# Patient Record
Sex: FEMALE | Race: WHITE | NOT HISPANIC OR LATINO | Employment: OTHER
[De-identification: names, ages, dates, MRNs, and addresses within clinical notes are randomized per-mention and may not be internally consistent; named-entity substitution may affect disease eponyms.]

---

## 2017-01-11 ENCOUNTER — SURGERY (OUTPATIENT)
Age: 62
End: 2017-01-11

## 2017-01-11 RX ADMIN — BUPIVACAINE HYDROCHLORIDE 10 ML: 2.5 INJECTION, SOLUTION EPIDURAL; INFILTRATION; INTRACAUDAL; PERINEURAL at 09:01

## 2017-01-11 RX ADMIN — MIDAZOLAM HYDROCHLORIDE 1 MG: 1 INJECTION, SOLUTION INTRAMUSCULAR; INTRAVENOUS at 09:01

## 2017-01-11 RX ADMIN — FENTANYL CITRATE 25 MCG: 50 INJECTION, SOLUTION INTRAMUSCULAR; INTRAVENOUS at 09:01

## 2017-01-11 RX ADMIN — LIDOCAINE HYDROCHLORIDE 20 ML: 10 INJECTION, SOLUTION INFILTRATION; PERINEURAL at 09:01

## 2017-01-18 ENCOUNTER — OFFICE VISIT (OUTPATIENT)
Dept: INTERNAL MEDICINE | Facility: CLINIC | Age: 62
End: 2017-01-18
Payer: COMMERCIAL

## 2017-01-18 VITALS
DIASTOLIC BLOOD PRESSURE: 78 MMHG | OXYGEN SATURATION: 98 % | BODY MASS INDEX: 21.62 KG/M2 | HEIGHT: 62 IN | TEMPERATURE: 99 F | WEIGHT: 117.5 LBS | HEART RATE: 87 BPM | SYSTOLIC BLOOD PRESSURE: 144 MMHG

## 2017-01-18 DIAGNOSIS — E78.5 HYPERLIPIDEMIA, UNSPECIFIED HYPERLIPIDEMIA TYPE: ICD-10-CM

## 2017-01-18 DIAGNOSIS — I10 HYPERTENSION, ESSENTIAL: Primary | ICD-10-CM

## 2017-01-18 DIAGNOSIS — Z12.11 COLON CANCER SCREENING: ICD-10-CM

## 2017-01-18 PROCEDURE — 99213 OFFICE O/P EST LOW 20 MIN: CPT | Mod: S$GLB,,, | Performed by: FAMILY MEDICINE

## 2017-01-18 PROCEDURE — 3074F SYST BP LT 130 MM HG: CPT | Mod: S$GLB,,, | Performed by: FAMILY MEDICINE

## 2017-01-18 PROCEDURE — 3078F DIAST BP <80 MM HG: CPT | Mod: S$GLB,,, | Performed by: FAMILY MEDICINE

## 2017-01-18 PROCEDURE — 99999 PR PBB SHADOW E&M-EST. PATIENT-LVL III: CPT | Mod: PBBFAC,,, | Performed by: FAMILY MEDICINE

## 2017-01-18 PROCEDURE — 1159F MED LIST DOCD IN RCRD: CPT | Mod: S$GLB,,, | Performed by: FAMILY MEDICINE

## 2017-01-18 RX ORDER — LISINOPRIL 20 MG/1
20 TABLET ORAL DAILY
Qty: 30 TABLET | Refills: 5 | Status: SHIPPED | OUTPATIENT
Start: 2017-01-18 | End: 2017-07-23 | Stop reason: SDUPTHER

## 2017-01-18 NOTE — PROGRESS NOTES
Subjective:       Patient ID: Didi Angeles is a 61 y.o. female.    Chief Complaint: Follow-up    HPI  Review of Systems   Constitutional: Negative for chills, fatigue and fever.   HENT: Negative for congestion and trouble swallowing.    Eyes: Negative for redness.   Respiratory: Negative for cough, chest tightness and shortness of breath.    Cardiovascular: Negative for chest pain, palpitations and leg swelling.   Gastrointestinal: Negative for abdominal pain and blood in stool.   Genitourinary: Negative for hematuria and menstrual problem.   Musculoskeletal: Positive for arthralgias and back pain. Negative for gait problem, joint swelling, myalgias and neck pain.   Skin: Negative for color change and rash.   Neurological: Negative for tremors, speech difficulty, weakness, numbness and headaches.   Hematological: Negative for adenopathy. Does not bruise/bleed easily.   Psychiatric/Behavioral: Negative for behavioral problems, confusion and sleep disturbance. The patient is not nervous/anxious.        Objective:      Physical Exam   Constitutional: She is oriented to person, place, and time. She appears well-developed and well-nourished. No distress.   Neck: Neck supple.   Pulmonary/Chest: Effort normal.   Musculoskeletal: She exhibits no edema.        Right lower leg: She exhibits no edema.        Left lower leg: She exhibits no edema.   Neurological: She is alert and oriented to person, place, and time.   Skin: Skin is warm and dry. No rash noted.   Psychiatric: She has a normal mood and affect. Her behavior is normal. Judgment and thought content normal.   Nursing note and vitals reviewed.      Assessment:       1. Hypertension, essential    2. Hyperlipidemia, unspecified hyperlipidemia type    3. Colon cancer screening        Plan:   Didi was seen today for follow-up.    Diagnoses and all orders for this visit:    Hypertension, essential    Hyperlipidemia, unspecified hyperlipidemia type    Colon cancer  screening  -     Case request GI: COLONOSCOPY    Other orders  -     lisinopril (PRINIVIL,ZESTRIL) 20 MG tablet; Take 1 tablet (20 mg total) by mouth once daily.      See meds, orders, follow up, routing and instructions sections of encounter.  The patient is in for a blood pressure followup.  We discussed her labs on her   last visit.  She was started on lisinopril 10 and pravastatin.  Her blood   pressure manually today was above 140.  We will increase the lisinopril dose to   20.  She is having no appreciable side effects.  Follow up in two months with   laboratory.    I was able to convince her for a colonoscopy today.  She is having some   radioablation therapy for her back in the meantime.      BUNNY/HN  dd: 01/18/2017 12:21:36 (CST)  td: 01/19/2017 01:19:27 (CST)  Doc ID   #[id number]  Job ID #[id number]    CC:

## 2017-01-18 NOTE — MR AVS SNAPSHOT
[facility]  [address]  [facility] [address]  Phone: [phone]  Fax: [phone]                  Didi Angeles   2017 1:00 PM   Office Visit    Description:  Female : 1955   Provider:  Carrillo Flynn MD   Department:  [facility]           Reason for Visit     Follow-up           Diagnoses this Visit        Comments    Hypertension, essential    -  Primary     Hyperlipidemia, unspecified hyperlipidemia type         Colon cancer screening                To Do List           Future Appointments        Provider Department Dept Phone    2017 1:00 PM Carrillo Flynn MD [facility] [phone]    2017 9:40 AM Ana María Ibrahim MD [facility] [phone]    2017 10:45 AM Charley Bae MD [facility] Pain Management [phone]    3/13/2017 8:00 AM LAB, SAME DAY [facility] [phone]      Your Future Surgeries/Procedures     2017   Surgery with Charley Bae MD   [facility]    [address]  [facility] [address] [phone]              To Schedule:     Please call the Endoscopy Department at [phone] to schedule your appointment.          Goals (5 Years of Data)     None      Follow-Up and Disposition     Return in about 2 months (around 3/18/2017) for Blood Pressure recheck, lab review (after future labs).    Follow-up and Disposition History       These Medications        Disp Refills Start End    lisinopril (PRINIVIL,ZESTRIL) 20 MG tablet 30 tablet 5 2017     Take 1 tablet (20 mg total) by mouth once daily. - Oral    Pharmacy: [facility]/pharmacy #[id number] - [place], [place] - [address]  #: [phone]         [facility] On Call     [facility] On Call Nurse Care Line -  Assistance  Registered nurses in the [facility] provide clinical advisement, health  "education, appointment booking, and other advisory services.  Call for this free service at [phone].             Medications           Message regarding Medications     Verify the changes and/or additions to your medication regime listed below are the same as discussed with your clinician today.  If any of these changes or additions are incorrect, please notify your healthcare provider.        CHANGE how you are taking these medications     Start Taking Instead of    lisinopril (PRINIVIL,ZESTRIL) 20 MG tablet lisinopril 10 MG tablet    Dosage:  Take 1 tablet (20 mg total) by mouth once daily. Dosage:  Take 1 tablet (10 mg total) by mouth once daily.    Reason for Change:  Reorder            Verify that the below list of medications is an accurate representation of the medications you are currently taking.  If none reported, the list may be blank. If incorrect, please contact your healthcare provider. Carry this list with you in case of emergency.           Current Medications     CALCIUM CARBONATE/VITAMIN D3 (CALCIUM 600 + D,3, ORAL) Take by mouth once daily.    duloxetine (CYMBALTA) 60 MG capsule Take 1 capsule (60 mg total) by mouth once daily.    gabapentin (NEURONTIN) 300 MG capsule Take 1 capsule (300 mg total) by mouth as directed. Take one cap i the morning, one at noon and two at bedtime.    KRILL OIL ORAL Take by mouth once daily.    lisinopril (PRINIVIL,ZESTRIL) 20 MG tablet Take 1 tablet (20 mg total) by mouth once daily.    pravastatin (PRAVACHOL) 40 MG tablet Take 1 tablet (40 mg total) by mouth once daily.           Clinical Reference Information           Vital Signs - Last Recorded  Most recent update: 1/18/2017 11:56 AM by Carrillo Flynn MD    BP Pulse Temp Ht Wt SpO2    (!) 144/78 87 98.6 °F (37 °C) 5' 2" (1.575 m) 53.3 kg (117 lb 8.1 oz) 98%    BMI                21.49 kg/m2          Blood Pressure          Most Recent Value    BP  (!)  144/78      Allergies as of 1/18/2017     " Compazine [Prochlorperazine Edisylate]    Sulfa (Sulfonamide Antibiotics)    Pcn [Penicillins]      Immunizations Administered on Date of Encounter - 1/18/2017     None      Orders Placed During Today's Visit      Normal Orders This Visit    Case request GI: COLONOSCOPY

## 2017-02-16 ENCOUNTER — OFFICE VISIT (OUTPATIENT)
Dept: PAIN MEDICINE | Facility: CLINIC | Age: 62
End: 2017-02-16
Payer: COMMERCIAL

## 2017-02-16 VITALS
TEMPERATURE: 98 F | BODY MASS INDEX: 21.67 KG/M2 | RESPIRATION RATE: 18 BRPM | SYSTOLIC BLOOD PRESSURE: 130 MMHG | HEIGHT: 62 IN | HEART RATE: 72 BPM | DIASTOLIC BLOOD PRESSURE: 76 MMHG | WEIGHT: 117.75 LBS

## 2017-02-16 DIAGNOSIS — F32.2 MAJOR DEPRESSIVE DISORDER, SINGLE EPISODE, SEVERE WITHOUT PSYCHOTIC FEATURES: ICD-10-CM

## 2017-02-16 PROCEDURE — 3078F DIAST BP <80 MM HG: CPT | Mod: S$GLB,,, | Performed by: PSYCHIATRY & NEUROLOGY

## 2017-02-16 PROCEDURE — 99999 PR PBB SHADOW E&M-EST. PATIENT-LVL III: CPT | Mod: PBBFAC,,, | Performed by: PSYCHIATRY & NEUROLOGY

## 2017-02-16 PROCEDURE — 99214 OFFICE O/P EST MOD 30 MIN: CPT | Mod: S$GLB,,, | Performed by: PSYCHIATRY & NEUROLOGY

## 2017-02-16 PROCEDURE — 3075F SYST BP GE 130 - 139MM HG: CPT | Mod: S$GLB,,, | Performed by: PSYCHIATRY & NEUROLOGY

## 2017-02-16 RX ORDER — DULOXETIN HYDROCHLORIDE 60 MG/1
60 CAPSULE, DELAYED RELEASE ORAL DAILY
Qty: 60 CAPSULE | Refills: 2 | Status: SHIPPED | OUTPATIENT
Start: 2017-02-16 | End: 2017-04-25 | Stop reason: SDUPTHER

## 2017-02-16 RX ORDER — GABAPENTIN 300 MG/1
300 CAPSULE ORAL 3 TIMES DAILY
Qty: 120 CAPSULE | Refills: 2 | Status: SHIPPED | OUTPATIENT
Start: 2017-02-16 | End: 2017-04-25 | Stop reason: SDUPTHER

## 2017-02-16 RX ORDER — OXYCODONE AND ACETAMINOPHEN 5; 325 MG/1; MG/1
1 TABLET ORAL 2 TIMES DAILY PRN
Qty: 20 TABLET | Refills: 0 | Status: SHIPPED | OUTPATIENT
Start: 2017-02-16 | End: 2017-04-25 | Stop reason: SDUPTHER

## 2017-02-16 NOTE — PROGRESS NOTES
"Outpatient Psychiatry Follow-Up Visit (MD/NP)    2/16/2017    Clinical Status of Patient:  Outpatient (Ambulatory)    Chief Complaint:  Didi Angeles is a 61 y.o. female who presents today for follow-up of depression.  Met with patient.      Interval History and Content of Current Session:  Interim Events/Subjective Report/Content of Current Session: Pt reports that she has been taking the cymbalta and the gabapentin as prescribed and that she is doing "O"", better than she was beofre but still having issues with the chronic pain. That she is trying to exercise but the pain makes it hard for her to stay compliant. That she had an RFA done and that did help. That she is going on a trip to meet her mother tomorrow and she is very worried about being able to withstand the pain, talked about this and I will give her a prescription for percocet 5 mg , kurt as needed only. Have also encourgaed pt to get back to yoga classes. She rports that her mood has been "OK".      Psychotherapy:  · Target symptoms: depression, chronic pain  · Why chosen therapy is appropriate versus another modality: relevant to diagnosis, patient responds to this modality  · Outcome monitoring methods: self-report, observation  · Therapeutic intervention type: behavior modifying psychotherapy, supportive psychotherapy  · Topics discussed/themes: stress related to medical comorbidities, difficulty managing affect in interpersonal relationships, building skills sets for symptom management, symptom recognition  · The patient's response to the intervention is accepting. The patient's progress toward treatment goals is fair.   · Duration of intervention: 30 minutes.    Review of Systems   · PSYCHIATRIC: Pertinant items are noted in the narrative.    Past Medical, Family and Social History: The patient's past medical, family and social history have been reviewed and updated as appropriate within the electronic medical record - see encounter " "notes.    Compliance: yes    Side effects: None    Risk Parameters:  Patient reports no suicidal ideation  Patient reports no homicidal ideation  Patient reports no self-injurious behavior  Patient reports no violent behavior    Exam (detailed: at least 9 elements; comprehensive: all 15 elements)   Constitutional  Vitals:  Most recent vital signs, dated less than 90 days prior to this appointment, were reviewed.   Vitals:    02/16/17 0946   BP: 130/76   Pulse: 72   Resp: 18   Temp: 97.7 °F (36.5 °C)   TempSrc: Oral   Weight: 53.4 kg (117 lb 11.6 oz)   Height: 5' 2" (1.575 m)        General:  age appropriate, casually dressed, neatly groomed     Musculoskeletal  Muscle Strength/Tone:  no tremor, no tic   Gait & Station:  non-ataxic     Psychiatric  Speech:  no latency; no press   Mood & Affect:  " in pain"  congruent and appropriate   Thought Process:  normal and logical, goal-directed   Associations:  intact   Thought Content:  normal, no suicidality, no homicidality, delusions, or paranoia   Insight:  intact   Judgement: behavior is adequate to circumstances   Orientation:  grossly intact   Memory: intact for content of interview   Language: grossly intact   Attention Span & Concentration:  able to focus   Fund of Knowledge:  intact and appropriate to age and level of education     Assessment and Diagnosis   Status/Progress: Based on the examination today, the patient's problem(s) is/are inadequately controlled.  New problems have been presented today.   Co-morbidities are complicating management of the primary condition.  There are no active rule-out diagnoses for this patient at this time.       Impression: Pt is a 62 Y/O CW with PMHx sig for Chronic low back pain , OA with        Opioid dependence in efr.  Major depressive disorder, mod, recurrent          Strengths and Liabilities: Strength: Patient accepts guidance/feedback, Strength: Patient is expressive/articulate., Strength: Patient is intelligent., " Strength: Patient is motivated for change., Strength: Patient has positive support network., Strength: Patient has reasonable judgment.      Treatment Goals: Specify outcomes written in observable, behavioral terms:    Depression: acquiring relapse prevention skills, eliminating all depressive symptoms (BDI score <10 for 1 month), increasing energy, increasing interest in usual activities, increasing motivation, increasing self-reward for positive behaviors (one/day), increasing self-reward for positive thoughts (one/day), increasing social contacts (three/week), reducing excessive guilt, reducing fatigue and reducing negative automatic thoughts      Treatment Plan/Recommendations:    · Medication Management: Continue current medications. The risks and benefits of medication were discussed with the patient.  · Referral for further treatment to social work team for psychotherapy  · The treatment plan and follow up plan were reviewed with the patient.  Will cont the cymbalta 60 mg daily.  Will add percocet 5 mg bid prn severe pain   Will cont neurontin 300 mg in AM and noon and 600 mg at bedtime.   Discussed coping skills.  Discussed cont to use CBT.  Provided supportive psychotherapy.  Have discussed things pt can do to help with socialization.          Return to Clinic: 2 months

## 2017-02-16 NOTE — MR AVS SNAPSHOT
[facility]  [address]  [place] [address]  Phone: [phone]  Fax: [phone]                  Didi Angeles   2017 9:40 AM   Office Visit    Description:  Female : 1955   Provider:  Ana María Ibrahim MD   Department:  [facility]           Reason for Visit     Follow-up           Diagnoses this Visit        Comments    Major depressive disorder, single episode, severe without psychotic features                To Do List           Future Appointments        Provider Department Dept Phone    2017 10:45 AM Charley Bae MD [facility] [phone]    3/13/2017 8:00 AM LAB, SAME DAY [facility] [phone]    3/20/2017 10:40 AM Carrillo Flynn MD [facility] [phone]    2017 9:00 AM Ana María Ibrahim MD [facility] [phone]      Goals (5 Years of Data)     None       These Medications        Disp Refills Start End    duloxetine (CYMBALTA) 60 MG capsule 60 capsule 2 2017     Take 1 capsule (60 mg total) by mouth once daily. - Oral    [id number] [address] Ph #: [phone]       gabapentin (NEURONTIN) 300 MG capsule 120 capsule 2 2017    Take 1 capsule (300 mg total) by mouth 3 (three) times daily. Take one cap i the morning, one at noon and two at bedtime. - Oral    Pharmacy: [id number] [address] Ph #: [phone]       oxycodone-acetaminophen (PERCOCET) 5-325 mg per tablet 20 tablet 0 2017     Take 1 tablet by mouth 2 (two) times daily as needed for Pain. - Oral    [id number] [address] Ph #: [phone]         [facility] On Call     [facility] On Call Nurse Care Line - 24/7 Assistance  Registered nurses in the [facility] provide clinical advisement, health  education, appointment booking, and other advisory services.  Call for this free service at [phone].             Medications           Message regarding Medications     Verify the changes and/or additions to your medication regime listed below are the same as discussed with your clinician today.  If any of these changes or additions are incorrect, please notify your healthcare provider.        START taking these NEW medications        Refills    oxycodone-acetaminophen (PERCOCET) 5-325 mg per tablet 0    Sig: Take 1 tablet by mouth 2 (two) times daily as needed for Pain.    Class: Print    Route: Oral      CHANGE how you are taking these medications     Start Taking Instead of    gabapentin (NEURONTIN) 300 MG capsule gabapentin (NEURONTIN) 300 MG capsule    Dosage:  Take 1 capsule (300 mg total) by mouth 3 (three) times daily. Take one cap i the morning, one at noon and two at bedtime. Dosage:  Take 1 capsule (300 mg total) by mouth as directed. Take one cap i the morning, one at noon and two at bedtime.    Reason for Change:  Reorder            Verify that the below list of medications is an accurate representation of the medications you are currently taking.  If none reported, the list may be blank. If incorrect, please contact your healthcare provider. Carry this list with you in case of emergency.           Current Medications     CALCIUM CARBONATE/VITAMIN D3 (CALCIUM 600 + D,3, ORAL) Take by mouth once daily.    duloxetine (CYMBALTA) 60 MG capsule Take 1 capsule (60 mg total) by mouth once daily.    gabapentin (NEURONTIN) 300 MG capsule Take 1 capsule (300 mg total) by mouth 3 (three) times daily. Take one cap i the morning, one at noon and two at bedtime.    KRILL OIL ORAL Take by mouth once daily.    lisinopril (PRINIVIL,ZESTRIL) 20 MG tablet Take 1 tablet (20 mg total) by mouth once daily.    pravastatin (PRAVACHOL) 40 MG tablet Take 1 tablet (40 mg total) by mouth once daily.     "oxycodone-acetaminophen (PERCOCET) 5-325 mg per tablet Take 1 tablet by mouth 2 (two) times daily as needed for Pain.           Clinical Reference Information           Your Vitals Were     BP Pulse Temp Resp Height Weight    130/76 72 97.7 °F (36.5 °C) (Oral) 18 5' 2" (1.575 m) 53.4 kg (117 lb 11.6 oz)    BMI                21.53 kg/m2          Blood Pressure          Most Recent Value    BP  130/76      Allergies as of 2/16/2017     Compazine [Prochlorperazine Edisylate]    Sulfa (Sulfonamide Antibiotics)    Pcn [Penicillins]      Immunizations Administered on Date of Encounter - 2/16/2017     None      Language Assistance Services     ATTENTION: Language assistance services are available, free of charge. Please call [phone].      ATENCIÓN: Si mk mccarthy, tiene a barnhart disposición servicios gratuitos de asistencia lingüística. Llame al [phone].     CHÚ Ý: N?u b?n nói Ti?ng Vi?t, có các d?ch v? h? tr? ngôn ng? mi?n phí dành cho b?n. G?i s? [phone].         [facility] complies with applicable Federal civil rights laws and does not discriminate on the basis of race, color, national origin, age, disability, or sex.        "

## 2017-02-23 ENCOUNTER — OFFICE VISIT (OUTPATIENT)
Dept: PAIN MEDICINE | Facility: CLINIC | Age: 62
End: 2017-02-23
Attending: ANESTHESIOLOGY
Payer: COMMERCIAL

## 2017-02-23 VITALS
HEIGHT: 62 IN | WEIGHT: 119 LBS | HEART RATE: 87 BPM | SYSTOLIC BLOOD PRESSURE: 148 MMHG | TEMPERATURE: 98 F | BODY MASS INDEX: 21.9 KG/M2 | DIASTOLIC BLOOD PRESSURE: 92 MMHG

## 2017-02-23 DIAGNOSIS — M47.816 FACET ARTHROPATHY, LUMBAR: ICD-10-CM

## 2017-02-23 DIAGNOSIS — M53.3 SACROILIAC JOINT PAIN: Primary | ICD-10-CM

## 2017-02-23 PROCEDURE — 99999 PR PBB SHADOW E&M-EST. PATIENT-LVL III: CPT | Mod: PBBFAC,,, | Performed by: ANESTHESIOLOGY

## 2017-02-23 PROCEDURE — 3080F DIAST BP >= 90 MM HG: CPT | Mod: S$GLB,,, | Performed by: ANESTHESIOLOGY

## 2017-02-23 PROCEDURE — 3077F SYST BP >= 140 MM HG: CPT | Mod: S$GLB,,, | Performed by: ANESTHESIOLOGY

## 2017-02-23 PROCEDURE — 1160F RVW MEDS BY RX/DR IN RCRD: CPT | Mod: S$GLB,,, | Performed by: ANESTHESIOLOGY

## 2017-02-23 PROCEDURE — 99213 OFFICE O/P EST LOW 20 MIN: CPT | Mod: S$GLB,,, | Performed by: ANESTHESIOLOGY

## 2017-02-23 NOTE — PROGRESS NOTES
"  Chronic Pain - Follow Up    Referring Physician: No ref. provider found    Chief Complaint:   Chief Complaint   Patient presents with    Low-back Pain     Interval History 2/23/2017 :  The patient returns today following her left and right L3-5 RFA.  She has received lasting relief taking her pain down to a 2/10 today.  She has continued to see Dr. Ibrahim with continued benefit.  Additionally, she has continued the gabapentin 300 mg TID and the Duloxetine 60 mg daily.  She had a "crisis" a few weeks ago when she required a short course of percocet 5/325 mg BID PRN #20 dispensed without refills by Dr. Ibrahim due to an urgent need to drive long distance to [place] for "stressfull personal reasons".  Other than that she has had a chronic non-progressive bilateral symmetric muscle tightness at the mid-back that wraps around to the mid-axillary line without any other problems of LE weakness, pain, anterior belly pain, or GI distress.      Interval History 12/21/2016:  The patient returns today for follow up of lower back pain.  She is s/p bilateral L3,4,5 MBB on 12/7/16 with 90% relief for one day.  She is set up for RFAs next month because she had to wait until she has a .  She reports increased anxiety and stress recently regarding the health of her elderly mother and helping to care for her.  She continues to see Dr. Ibrahim.  She resumed Gabapentin.  She continues to take Cymbalta also.  Her pain today is 6/10.    Interval History 11/16/2016:  The patient returns today for follow up of all over pain.  Her worse pain today is located to her lower back.  She has intermittent radiation to her knees but this is rare.  She does have some numbness to her feet.  She has been weaning down Gabapentin because she wants to try to get off of it.  We have previously discussed lumbar MBB which she would like to further discuss at this time.  She reports depression lately related to chronic pain.  She feels " that her daily activities are inhibited because of her pain.  She has an appointment with Dr. Ibrahim today.  Her pain today is 3/10.    Interval History 9/15/2016:  The patient returns today for follow up of lower back pain.  Her pain mostly stays in her lower back without radiation. She does report numbness to BLE.  Reports that she has been doing well since her last visit.  She has weaned off of MS Contin and is not currently taking any opioid medications.  She is taking Cymbalta and Gabapentin with relief.  We have previously discussed medial branch blocks which she is still considering.  Her pain today is 4/10.  The patient denies any bowel or bladder incontinence or signs of saddle paresthesia.     Interval History 8/5/2016  The patient returns to the clinic today for a follow up visit, reports feeling much better, states she has low back pain at present of 2/10. Voices no other health concerns.    Interval History 6/28/2016:  The patient returns today for follow up of lower back and buttock pain. She denies any radiation into her legs. She denies any numbness or tingling. She is s/p bilateral SI joint injection on 6/7/16 with moderate relief of her buttock pain. We have not yet received records from previous pain physician in [place] She reports having cervical RFAs in the past but does not think she has had lumbar RFAs. She does report having lumbar facet joint injections in the past with relief. She has seen Dr. Ibrahim since her last visit, who recommends continued weaning of patient's opioids. She also has an appointment with Dr. Cheung, rheumatology, next month. Since her last visit, she has stopped the Percocet. She is now taking MS Contin 30 mg Q12h. She is also taking Gabapentin 100 mg TID and Cymbalta 60 mg QD. Her pain today is a 2/10.     Interval History 05/24/2016:  Patient presents in clinic with low back pain which she states is a 3/10 today. She currently takes MS Contin 30mg q12h,  Percocet 10/325 mg PRN, and Cymbalta.Since last visit patient has been able to wean down her percocet use. She was on average previously she was taking it TID and now is taking 1 pill every other day on average. She has increased her physical activity and is doing PT. No other health changes noted. We have not received records yet. She has not been scheduled to see Dr. Ibrahim or Rheumatology yet.    Initial encounter:    Didi Angeles presents to the clinic for the evaluation of Chronic Neck and low back pain. The pain started years ago following no injury and symptoms have been unchanged.    Brief history: Patient has a pain physician in [facility] and has been receiving a combination of Cymbalta 60 mg per day, morphine sulfate extended release 30 mg twice a day and oxycodone acetaminophen 10/325 every 8 hour by mouth when necessary and her  is consistent dating back to 4/16/2015, she is establishing care at [facility] with Dr. Flynn as her PCP.    DXA scan     Pain Description:    The current worst pain is in the lumbar spine bilaterally over the area of the sacroiliac joints and facet joints.      At BEST  1/10     At WORST  7/10 on the WORST day.      On average pain is rated as 4/10.     Today the pain is rated as 3/10    The pain is described as aching, dull, shooting, stabbing, tight band, tingling and grabbing and sore.      Symptoms interfere with daily activity and sleeping.     Exacerbating factors: Sitting, Standing and Lifting.      Mitigating factors laying down, medications and walking and stretching.     Patient denies night fever/night sweats, urinary incontinence, bowel incontinence, significant weight loss, significant motor weakness and loss of sensations.  Patient denies any suicidal or homicidal ideations    Pain Medications:  Current:  Cymbalta 60 daily, Gabapentin 300 mg TID    Tried in Past:  NSAIDs -Mobic and celebrex and methotrexate  TCA -Never  SNRI - Prozac  Anti-convulsants -  Yes  Muscle Relaxants -skalaxin and flexeril  Opioids-Oxycontin 20 mg, MS Contin, Percocet    Physical Therapy/Home Exercise: yes, although not in physical therapy     report:  Reviewed and consistent with medication use as prescribed.    Pain Procedures: several outside procedures reported  12/7/16 Bilateral L3,4,5 MBB- 90% relief  1/11/17 Right L3-5 RFA - 80-90% relief  1/25/17 Right L3-5 RFA - 80-90% relief    Chiropractor -never  Acupuncture - never  TENS unit -never  Spinal decompression -never  Joint replacement -never    Imaging:     Lumbar XRAY 4/26/16  Narrative   Indication: Low back pain    Comparison: None    Findings: The lumbar spine demonstrates levoscoliosis centered at L3. Associated rotary deformity and leftward mild subluxation of L4 over L5. Dextroscoliosis extends into the thoracic spine, incompletely imaged.    No anterior or retrolisthesis in the lumbar spine. No instability between flexion and extension images.    No displaced fracture.    Mild and moderate multilevel lumbar spine facet DJD, most prominent at L5-S1. Moderate multilevel degenerative disc disease most prominent at L2-3.    Unremarkable soft tissues.   Impression     1. No instability or acute abnormality.    2. Mild and moderate multilevel lumbar facet DJD.    3. S-shaped thoracolumbar scoliosis and leftward subluxation of L4 over L5         Past Medical History   Diagnosis Date    Anxiety     Arthritis     Depression     Hyperlipidemia     Hypertension      Past Surgical History   Procedure Laterality Date    Hysterectomy      Appendectomy      Tonsillectomy      Cosmetic surgery       reduction     Social History     Social History    Marital status:      Spouse name: N/A    Number of children: 1    Years of education: N/A     Occupational History    ret psychologist      Social History Main Topics    Smoking status: Former Smoker    Smokeless tobacco: Never Used    Alcohol use Not on file    Drug  use: Not on file    Sexual activity: Not on file     Other Topics Concern    Not on file     Social History Narrative     Family History   Problem Relation Age of Onset    Dementia Mother     Cancer Mother      breast    Heart disease Father     Heart disease Maternal Grandfather        Review of patient's allergies indicates:   Allergen Reactions    Compazine [prochlorperazine edisylate] Nausea Only    Sulfa (sulfonamide antibiotics)     Pcn [penicillins] Rash       Current Outpatient Prescriptions   Medication Sig    CALCIUM CARBONATE/VITAMIN D3 (CALCIUM 600 + D,3, ORAL) Take by mouth once daily.    duloxetine (CYMBALTA) 60 MG capsule Take 1 capsule (60 mg total) by mouth once daily.    gabapentin (NEURONTIN) 300 MG capsule Take 1 capsule (300 mg total) by mouth 3 (three) times daily. Take one cap i the morning, one at noon and two at bedtime.    KRILL OIL ORAL Take by mouth once daily.    lisinopril (PRINIVIL,ZESTRIL) 20 MG tablet Take 1 tablet (20 mg total) by mouth once daily.    oxycodone-acetaminophen (PERCOCET) 5-325 mg per tablet Take 1 tablet by mouth 2 (two) times daily as needed for Pain.    pravastatin (PRAVACHOL) 40 MG tablet Take 1 tablet (40 mg total) by mouth once daily.     No current facility-administered medications for this visit.        REVIEW OF SYSTEMS:    GENERAL:  No weight loss, malaise or fevers.  RESPIRATORY:  Negative for cough, wheezing or shortness of breath, patient denies any recent URI.  CARDIOVASCULAR:  Negative for chest pain, leg swelling or palpitations.  GI:  Negative for abdominal discomfort, blood in stools or black stools or change in bowel habits.  MUSCULOSKELETAL:  See HPI.  SKIN:  Negative for lesions, rash, and itching.  PSYCH: H/O anxiety and depression treated with Cymbalta. Patient's sleep is disturbed secondary to pain.  HEMATOLOGY/LYMPHOLOGY:  Negative for prolonged bleeding, bruising easily or swollen nodes.  Patient is not currently taking any  "anti-coagulants  ENDO: No history of diabetes or thyroid dysfunction  NEURO:   No history of headaches, syncope, paralysis, seizures or tremors.  All other reviewed and negative other than HPI.    OBJECTIVE:    Visit Vitals    BP (!) 148/92    Pulse 87    Temp 98.1 °F (36.7 °C) (Oral)    Ht 5' 2" (1.575 m)    Wt 54 kg (119 lb)    BMI 21.77 kg/m2       PHYSICAL EXAMINATION:    GENERAL: Well appearing, in no acute distress, alert and oriented x3.  PSYCH:  Mood and affect appropriate.  SKIN: Skin color, texture, turgor normal, no rashes or lesions.  HEAD/FACE:  Normocephalic, atraumatic. .  CV: RRR with palpation of the radial artery.  PULM: No evidence of respiratory difficulty, symmetric chest rise.  BACK:  There is diminished pain with palpation to bilateral facet joints and paraspinal muscles.  There is limited extension with pain but not as severe as previous exams.  BEBE is negative bilaterally.  Negative SLR bilaterally.  MUSCULOSKELETAL: Provocative maneuvers of the hips do not cause pain.  Bilateral lower extremity strength is normal and symmetric.  No atrophy or tone abnormalities are noted.  NEURO:  Cranial nerves grossly intact.  No loss of sensation to BLE noted.  GAIT: Normal.      ASSESSMENT: 61 y.o. year old female with lower back pain, consistent with the following diagnoses:    Encounter Diagnoses   Name Primary?    Sacroiliac joint pain Yes    Facet arthropathy, lumbar        PLAN:     -  The patient will continue a home exercise routine to help with pain and strengthening.      - She is s/p bilateral RFA L3,4,5 with significant benefit.      - Continue follow-up with Dr. Ibrahim for anxiety and depression.    - She has weaned from opioids.  She will continue Gabapentin 300 mg TID.  Will continue Cymbalta 60 mg daily.    - RTC 6 months or sooner PRN.    Ken Wilcox DO  Pain Fellow PGY5       The above plan and management options were discussed at length with patient. Patient is in " agreement with the above and verbalized understanding.     I reviewed and edited the fellow's note, I conducted my own interview and physical examination and agree with the findings.     Charley Bae  02/23/2017

## 2017-03-13 ENCOUNTER — LAB VISIT (OUTPATIENT)
Dept: LAB | Facility: HOSPITAL | Age: 62
End: 2017-03-13
Attending: FAMILY MEDICINE
Payer: COMMERCIAL

## 2017-03-13 DIAGNOSIS — E78.5 HYPERLIPIDEMIA, UNSPECIFIED HYPERLIPIDEMIA TYPE: ICD-10-CM

## 2017-03-13 LAB
ANION GAP SERPL CALC-SCNC: 8 MMOL/L
AST SERPL-CCNC: 20 U/L
BUN SERPL-MCNC: 21 MG/DL
CALCIUM SERPL-MCNC: 9.8 MG/DL
CHLORIDE SERPL-SCNC: 103 MMOL/L
CHOLEST/HDLC SERPL: 3 {RATIO}
CO2 SERPL-SCNC: 29 MMOL/L
CREAT SERPL-MCNC: 0.8 MG/DL
EST. GFR  (AFRICAN AMERICAN): >60 ML/MIN/1.73 M^2
EST. GFR  (NON AFRICAN AMERICAN): >60 ML/MIN/1.73 M^2
GLUCOSE SERPL-MCNC: 85 MG/DL
HDL/CHOLESTEROL RATIO: 33.8 %
HDLC SERPL-MCNC: 154 MG/DL
HDLC SERPL-MCNC: 52 MG/DL
LDLC SERPL CALC-MCNC: 68.8 MG/DL
NONHDLC SERPL-MCNC: 102 MG/DL
POTASSIUM SERPL-SCNC: 4.4 MMOL/L
SODIUM SERPL-SCNC: 140 MMOL/L
TRIGL SERPL-MCNC: 166 MG/DL

## 2017-03-13 PROCEDURE — 84450 TRANSFERASE (AST) (SGOT): CPT

## 2017-03-13 PROCEDURE — 36415 COLL VENOUS BLD VENIPUNCTURE: CPT

## 2017-03-13 PROCEDURE — 80048 BASIC METABOLIC PNL TOTAL CA: CPT

## 2017-03-13 PROCEDURE — 80061 LIPID PANEL: CPT

## 2017-03-20 ENCOUNTER — OFFICE VISIT (OUTPATIENT)
Dept: INTERNAL MEDICINE | Facility: CLINIC | Age: 62
End: 2017-03-20
Payer: COMMERCIAL

## 2017-03-20 VITALS
BODY MASS INDEX: 21.99 KG/M2 | SYSTOLIC BLOOD PRESSURE: 122 MMHG | HEART RATE: 71 BPM | HEIGHT: 62 IN | WEIGHT: 119.5 LBS | OXYGEN SATURATION: 99 % | DIASTOLIC BLOOD PRESSURE: 64 MMHG | TEMPERATURE: 98 F

## 2017-03-20 DIAGNOSIS — E78.5 HYPERLIPIDEMIA, UNSPECIFIED HYPERLIPIDEMIA TYPE: ICD-10-CM

## 2017-03-20 DIAGNOSIS — I10 HYPERTENSION, ESSENTIAL: Primary | ICD-10-CM

## 2017-03-20 PROCEDURE — 99213 OFFICE O/P EST LOW 20 MIN: CPT | Mod: S$GLB,,, | Performed by: FAMILY MEDICINE

## 2017-03-20 PROCEDURE — 99999 PR PBB SHADOW E&M-EST. PATIENT-LVL III: CPT | Mod: PBBFAC,,, | Performed by: FAMILY MEDICINE

## 2017-03-20 PROCEDURE — 1160F RVW MEDS BY RX/DR IN RCRD: CPT | Mod: S$GLB,,, | Performed by: FAMILY MEDICINE

## 2017-03-20 PROCEDURE — 3078F DIAST BP <80 MM HG: CPT | Mod: S$GLB,,, | Performed by: FAMILY MEDICINE

## 2017-03-20 PROCEDURE — 3074F SYST BP LT 130 MM HG: CPT | Mod: S$GLB,,, | Performed by: FAMILY MEDICINE

## 2017-03-20 NOTE — MR AVS SNAPSHOT
[facility]  [address]  [facility] [address]  Phone: [phone]  Fax: [phone]                  Didi Angeles   3/20/2017 10:40 AM   Office Visit    Description:  Female : 1955   Provider:  Carrillo Flynn MD   Department:  [facility] - Internal Medicine           Reason for Visit     Follow-up           Diagnoses this Visit        Comments    Hypertension, essential    -  Primary     Hyperlipidemia, unspecified hyperlipidemia type                To Do List           Future Appointments        Provider Department Dept Phone    2017 9:00 AM Ana María Ibrahim MD [facility] - Pain Management [phone]      Goals (5 Years of Data)     None      Follow-Up and Disposition     Return in about 6 months (around 2017) for Blood Pressure recheck, to reassess treatment for condition or medication.      [facility]     [facility] - 24/7 Assistance  Registered nurses in the [facility] provide clinical advisement, health education, appointment booking, and other advisory services.  Call for this free service at [phone].             Medications           Message regarding Medications     Verify the changes and/or additions to your medication regime listed below are the same as discussed with your clinician today.  If any of these changes or additions are incorrect, please notify your healthcare provider.             Verify that the below list of medications is an accurate representation of the medications you are currently taking.  If none reported, the list may be blank. If incorrect, please contact your healthcare provider. Carry this list with you in case of emergency.           Current Medications     CALCIUM CARBONATE/VITAMIN D3 (CALCIUM 600 + D,3, ORAL) Take by mouth once daily.    duloxetine (CYMBALTA) 60 MG capsule Take 1 capsule (60 mg total) by mouth once daily.    gabapentin (NEURONTIN) 300 MG capsule Take 1  "capsule (300 mg total) by mouth 3 (three) times daily. Take one cap i the morning, one at noon and two at bedtime.    KRILL OIL ORAL Take by mouth once daily.    lisinopril (PRINIVIL,ZESTRIL) 20 MG tablet Take 1 tablet (20 mg total) by mouth once daily.    oxycodone-acetaminophen (PERCOCET) 5-325 mg per tablet Take 1 tablet by mouth 2 (two) times daily as needed for Pain.    pravastatin (PRAVACHOL) 40 MG tablet Take 1 tablet (40 mg total) by mouth once daily.           Clinical Reference Information           Your Vitals Were     BP Pulse Temp Height Weight SpO2    122/64 (BP Location: Right arm, Patient Position: Sitting, BP Method: Manual) 71 98.3 °F (36.8 °C) (Oral) 5' 2" (1.575 m) 54.2 kg (119 lb 7.8 oz) 99%    BMI                21.85 kg/m2          Blood Pressure          Most Recent Value    BP  122/64      Allergies as of 3/20/2017     Compazine [Prochlorperazine Edisylate]    Sulfa (Sulfonamide Antibiotics)    Pcn [Penicillins]      Immunizations Administered on Date of Encounter - 3/20/2017     None      Language Assistance Services     ATTENTION: Language assistance services are available, free of charge. Please call [phone].      ATENCIÓN: Si mk shari, tiene a barnhart disposición servicios gratuitos de asistencia lingüística. Llame al [phone].     VALDEZ Ý: N?u b?n nói Ti?ng Vi?t, có các d?ch v? h? tr? ngôn ng? mi?n phí dành cho b?n. G?i s? [phone].         [facility] complies with applicable Federal civil rights laws and does not discriminate on the basis of race, color, national origin, age, disability, or sex.        "

## 2017-03-20 NOTE — PROGRESS NOTES
Subjective:       Patient ID: Didi Angeles is a 61 y.o. female.    Chief Complaint: Follow-up    HPI  Review of Systems   Constitutional: Negative for chills, fatigue and fever.   HENT: Negative for congestion and trouble swallowing.    Eyes: Negative for redness.   Respiratory: Negative for cough, chest tightness and shortness of breath.    Cardiovascular: Negative for chest pain, palpitations and leg swelling.   Gastrointestinal: Negative for abdominal pain and blood in stool.   Genitourinary: Negative for hematuria and menstrual problem.   Musculoskeletal: Negative for arthralgias, back pain, gait problem, joint swelling, myalgias and neck pain.   Skin: Negative for color change and rash.   Neurological: Negative for tremors, speech difficulty, weakness, numbness and headaches.   Hematological: Negative for adenopathy. Does not bruise/bleed easily.   Psychiatric/Behavioral: Negative for behavioral problems, confusion and sleep disturbance. The patient is not nervous/anxious.        Objective:      Physical Exam   Constitutional: She is oriented to person, place, and time. She appears well-developed and well-nourished. No distress.   Neck: Neck supple.   Pulmonary/Chest: Effort normal.   Musculoskeletal: She exhibits no edema.        Right lower leg: She exhibits no edema.        Left lower leg: She exhibits no edema.   Neurological: She is alert and oriented to person, place, and time.   Skin: Skin is warm and dry. No rash noted.   Psychiatric: She has a normal mood and affect. Her behavior is normal. Judgment and thought content normal.   Nursing note and vitals reviewed.      Assessment:       1. Hypertension, essential    2. Hyperlipidemia, unspecified hyperlipidemia type        Plan:   Didi was seen today for follow-up.    Diagnoses and all orders for this visit:    Hypertension, essential    Hyperlipidemia, unspecified hyperlipidemia type      See meds, orders, follow up, routing and instructions  sections of encounter.  A 61-year-old patient.  She is following up for her blood pressure.  At her last   visit, we increased her lisinopril from 10 to 20 mg, although she is having   some cough, but does not seem to be as persistent.  She is taking some Tessalon   Perles with improvement.    RECOMMENDATIONS:  Continue current medications.  The patient declined digital   Hypertension management.  Follow up in six months and asked her to keep a home   blood pressure diary.      BUNNY/JONO  dd: 03/20/2017 13:37:00 (CDT)  td: 03/21/2017 05:46:46 (CDT)  Doc ID   #[id number]  Job ID #[id number]    CC:

## 2017-04-19 ENCOUNTER — TELEPHONE (OUTPATIENT)
Dept: PAIN MEDICINE | Facility: CLINIC | Age: 62
End: 2017-04-19

## 2017-04-19 NOTE — TELEPHONE ENCOUNTER
----- Message from Betsey Ahumada sent at 4/19/2017 11:16 AM CDT -----  _  1st Request  _  2nd Request  _  3rd Request        Who: patient    Why: pt is returning your call to reschedule her appt from 4/17/17    What Number to Call Back:  [phone]  When to Expect a call back: (Before the end of the day)   -- if the call is after 12:00, the call back will be tomorrow.

## 2017-04-25 ENCOUNTER — OFFICE VISIT (OUTPATIENT)
Dept: PAIN MEDICINE | Facility: CLINIC | Age: 62
End: 2017-04-25
Payer: COMMERCIAL

## 2017-04-25 VITALS
HEART RATE: 62 BPM | RESPIRATION RATE: 20 BRPM | BODY MASS INDEX: 21.91 KG/M2 | WEIGHT: 119.06 LBS | SYSTOLIC BLOOD PRESSURE: 158 MMHG | TEMPERATURE: 98 F | HEIGHT: 62 IN | DIASTOLIC BLOOD PRESSURE: 96 MMHG

## 2017-04-25 DIAGNOSIS — F32.2 MAJOR DEPRESSIVE DISORDER, SINGLE EPISODE, SEVERE WITHOUT PSYCHOTIC FEATURES: ICD-10-CM

## 2017-04-25 PROCEDURE — 3077F SYST BP >= 140 MM HG: CPT | Mod: S$GLB,,, | Performed by: PSYCHIATRY & NEUROLOGY

## 2017-04-25 PROCEDURE — 3080F DIAST BP >= 90 MM HG: CPT | Mod: S$GLB,,, | Performed by: PSYCHIATRY & NEUROLOGY

## 2017-04-25 PROCEDURE — 99999 PR PBB SHADOW E&M-EST. PATIENT-LVL III: CPT | Mod: PBBFAC,,, | Performed by: PSYCHIATRY & NEUROLOGY

## 2017-04-25 PROCEDURE — 99214 OFFICE O/P EST MOD 30 MIN: CPT | Mod: S$GLB,,, | Performed by: PSYCHIATRY & NEUROLOGY

## 2017-04-25 PROCEDURE — 1160F RVW MEDS BY RX/DR IN RCRD: CPT | Mod: S$GLB,,, | Performed by: PSYCHIATRY & NEUROLOGY

## 2017-04-25 RX ORDER — DULOXETIN HYDROCHLORIDE 60 MG/1
60 CAPSULE, DELAYED RELEASE ORAL DAILY
Qty: 60 CAPSULE | Refills: 2 | Status: SHIPPED | OUTPATIENT
Start: 2017-04-25 | End: 2017-07-25 | Stop reason: SDUPTHER

## 2017-04-25 RX ORDER — GABAPENTIN 300 MG/1
300 CAPSULE ORAL 3 TIMES DAILY
Qty: 120 CAPSULE | Refills: 2 | Status: SHIPPED | OUTPATIENT
Start: 2017-04-25 | End: 2017-07-25 | Stop reason: SDUPTHER

## 2017-04-25 RX ORDER — OXYCODONE AND ACETAMINOPHEN 5; 325 MG/1; MG/1
1 TABLET ORAL 2 TIMES DAILY PRN
Qty: 20 TABLET | Refills: 0 | Status: SHIPPED | OUTPATIENT
Start: 2017-04-25 | End: 2017-07-25 | Stop reason: SDUPTHER

## 2017-04-25 NOTE — PROGRESS NOTES
"Outpatient Psychiatry Follow-Up Visit (MD/NP)    4/25/2017    Clinical Status of Patient:  Outpatient (Ambulatory)    Chief Complaint:  Didi Angeles is a 61 y.o. female who presents today for follow-up of depression.  Met with patient.      Interval History and Content of Current Session:  Interim Events/Subjective Report/Content of Current Session: Pt reports that she has been taking the cymbalta and the gabapentin as prescribed and that she is feeling "better" better than she was beofre but still having issues with the chronic pain. . She talked about a run that she attended in support of her friend and that she felt a lot better being in the presence of her colleagues. 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 So we discussed this today and I have encouraged her to start reading up on CME and to volunteer etc      Psychotherapy:  · Target symptoms: depression, chronic pain  · Why chosen therapy is appropriate versus another modality: relevant to diagnosis, patient responds to this modality  · Outcome monitoring methods: self-report, observation  · Therapeutic intervention type: behavior modifying psychotherapy, supportive psychotherapy  · Topics discussed/themes: stress related to medical comorbidities, difficulty managing affect in interpersonal relationships, building skills sets for symptom management, symptom recognition  · The patient's response to the intervention is accepting. The patient's progress toward treatment goals is fair.   · Duration of intervention: 30 minutes.    Review of Systems   · PSYCHIATRIC: Pertinant items are noted in the narrative.    Past Medical, Family and Social History: The patient's past medical, family and social history have been reviewed and updated as appropriate within the electronic medical record - see encounter notes.    Compliance: yes    Side effects: None    Risk Parameters:  Patient reports no suicidal ideation  Patient reports no homicidal ideation  Patient " "reports no self-injurious behavior  Patient reports no violent behavior    Exam (detailed: at least 9 elements; comprehensive: all 15 elements)   Constitutional  Vitals:  Most recent vital signs, dated less than 90 days prior to this appointment, were reviewed.   Vitals:    04/25/17 0928   Resp: 20   Weight: 54 kg (119 lb 0.8 oz)   Height: 5' 2" (1.575 m)        General:  age appropriate, casually dressed, neatly groomed     Musculoskeletal  Muscle Strength/Tone:  no tremor, no tic   Gait & Station:  non-ataxic     Psychiatric  Speech:  no latency; no press   Mood & Affect:  " in pain"  congruent and appropriate   Thought Process:  normal and logical, goal-directed   Associations:  intact   Thought Content:  normal, no suicidality, no homicidality, delusions, or paranoia   Insight:  intact   Judgement: behavior is adequate to circumstances   Orientation:  grossly intact   Memory: intact for content of interview   Language: grossly intact   Attention Span & Concentration:  able to focus   Fund of Knowledge:  intact and appropriate to age and level of education     Assessment and Diagnosis   Status/Progress: Based on the examination today, the patient's problem(s) is/are inadequately controlled.  New problems have been presented today.   Co-morbidities are complicating management of the primary condition.  There are no active rule-out diagnoses for this patient at this time.       Impression: Pt is a 60 Y/O CW with PMHx sig for Chronic low back pain , OA with        Opioid dependence in efr.  Major depressive disorder, mod, recurrent          Strengths and Liabilities: Strength: Patient accepts guidance/feedback, Strength: Patient is expressive/articulate., Strength: Patient is intelligent., Strength: Patient is motivated for change., Strength: Patient has positive support network., Strength: Patient has reasonable judgment.      Treatment Goals: Specify outcomes written in observable, behavioral terms:    Depression: " acquiring relapse prevention skills, eliminating all depressive symptoms (BDI score <10 for 1 month), increasing energy, increasing interest in usual activities, increasing motivation, increasing self-reward for positive behaviors (one/day), increasing self-reward for positive thoughts (one/day), increasing social contacts (three/week), reducing excessive guilt, reducing fatigue and reducing negative automatic thoughts      Treatment Plan/Recommendations:    · Medication Management: Continue current medications. The risks and benefits of medication were discussed with the patient.  · Referral for further treatment to social work team for psychotherapy  · The treatment plan and follow up plan were reviewed with the patient.  Will cont the cymbalta 60 mg daily.  Will cont percocet 5 mg bid prn severe pain #30  Will cont neurontin 300 mg in AM and noon and 600 mg at bedtime.   Discussed coping skills.  Discussed cont to use CBT.  Provided supportive psychotherapy.  To improve socialization        Return to Clinic: 3 months

## 2017-04-25 NOTE — MR AVS SNAPSHOT
[facility]  [address]  [facility] [address]  Phone: [phone]  Fax: [phone]                  Didi Angeles   2017 9:20 AM   Office Visit    Description:  Female : 1955   Provider:  Ana María Ibrahim MD   Department:  [facility]           Reason for Visit     Follow-up           Diagnoses this Visit        Comments    Major depressive disorder, single episode, severe without psychotic features                To Do List           Future Appointments        Provider Department Dept Phone    2017 9:20 AM Ana María Ibrahim MD [facility] [phone]      Goals (5 Years of Data)     None       These Medications        Disp Refills Start End    oxycodone-acetaminophen (PERCOCET) 5-325 mg per tablet 20 tablet 0 2017     Take 1 tablet by mouth 2 (two) times daily as needed for Pain. - Oral    [id number] [address] [place] Ph #: [phone]       gabapentin (NEURONTIN) 300 MG capsule 120 capsule 2 2017    Take 1 capsule (300 mg total) by mouth 3 (three) times daily. Take one cap i the morning, one at noon and two at bedtime. - Oral    Ph[facility]: [id number] [place] [facility] [address] Ph #: [phone]       duloxetine (CYMBALTA) 60 MG capsule 60 capsule 2 2017     Take 1 capsule (60 mg total) by mouth once daily. - Oral    Pharmacy: [id number] [place] [facility] [address] Ph #: [phone]         [facility]     [facility] -  Assistance  Unless otherwise directed by your provider, please contact [facility], our nurse care line that is available for  assistance.     Registered nurses in the [facility] provide: appointment scheduling, clinical advisement, health education, and other advisory services.  Call: [phone] (toll free)               Medications     "       Message regarding Medications     Verify the changes and/or additions to your medication regime listed below are the same as discussed with your clinician today.  If any of these changes or additions are incorrect, please notify your healthcare provider.             Verify that the below list of medications is an accurate representation of the medications you are currently taking.  If none reported, the list may be blank. If incorrect, please contact your healthcare provider. Carry this list with you in case of emergency.           Current Medications     CALCIUM CARBONATE/VITAMIN D3 (CALCIUM 600 + D,3, ORAL) Take by mouth once daily.    duloxetine (CYMBALTA) 60 MG capsule Take 1 capsule (60 mg total) by mouth once daily.    gabapentin (NEURONTIN) 300 MG capsule Take 1 capsule (300 mg total) by mouth 3 (three) times daily. Take one cap i the morning, one at noon and two at bedtime.    KRILL OIL ORAL Take by mouth once daily.    lisinopril (PRINIVIL,ZESTRIL) 20 MG tablet Take 1 tablet (20 mg total) by mouth once daily.    oxycodone-acetaminophen (PERCOCET) 5-325 mg per tablet Take 1 tablet by mouth 2 (two) times daily as needed for Pain.    pravastatin (PRAVACHOL) 40 MG tablet Take 1 tablet (40 mg total) by mouth once daily.           Clinical Reference Information           Your Vitals Were     BP Pulse Temp Resp Height Weight    158/96 62 98.2 °F (36.8 °C) (Oral) 20 5' 2" (1.575 m) 54 kg (119 lb 0.8 oz)    BMI                21.77 kg/m2          Blood Pressure          Most Recent Value    BP  (!)  158/96      Allergies as of 4/25/2017     Compazine [Prochlorperazine Edisylate]    Sulfa (Sulfonamide Antibiotics)    Pcn [Penicillins]      Immunizations Administered on Date of Encounter - 4/25/2017     None      Language Assistance Services     ATTENTION: Language assistance services are available, free of charge. Please call [phone].      ATENCIÓN: Si stacey leroy a barnhart disposición servicios " cathyos de asistencia lingüística. Nba anderson [phone].     VALDEZ Ý: N?u b?n nói Ti?ng Vi?t, có các d?ch v? h? tr? ngôn ng? mi?n phí dành cho b?n. G?i s? [phone].         [facility] complies with applicable Federal civil rights laws and does not discriminate on the basis of race, color, national origin, age, disability, or sex.

## 2017-05-31 ENCOUNTER — TELEPHONE (OUTPATIENT)
Dept: PAIN MEDICINE | Facility: CLINIC | Age: 62
End: 2017-05-31

## 2017-05-31 NOTE — TELEPHONE ENCOUNTER
----- Message from Ana María Ibrahim MD sent at 5/31/2017  3:07 PM CDT -----  Can we call her tomorrow morning?  ----- Message -----  From: Estefany Araujo RN  Sent: 5/30/2017   3:20 PM  To: MD Dr. Alexandria Guerra,   Pt requesting refill of Percocet. Pt. Seen on 4/25. Has follow up appointment on 7/25.     Thank you,   Estefany Araujo RN  ----- Message -----  From: Jo-Ann Dee  Sent: 5/30/2017   2:11 PM  To: Alexandria Gotti Staff    X_  1st Request  _  2nd Request  _  3rd Request    Please refill the medication(s) listed below. Please call the patient when the prescription(s) is ready for  at the phone number [phone].    Medication #1  oxycodone-acetaminophen (PERCOCET) 5-325 mg per tablet 20 tablet 0 4/25/2017  No  Sig - Route: Take 1 tablet by mouth 2 (two) times daily as needed for Pain. - Oral  Class: Print  Order: [id number]

## 2017-06-01 ENCOUNTER — TELEPHONE (OUTPATIENT)
Dept: PAIN MEDICINE | Facility: CLINIC | Age: 62
End: 2017-06-01

## 2017-06-01 NOTE — TELEPHONE ENCOUNTER
----- Message from Estefany Araujo RN sent at 6/1/2017  9:25 AM CDT -----  Hermelindae,   Can you check on this and contact this patient? Does Alexandria want her to come in for a visit and discuss?  Thanks,   Estefany  ----- Message -----  From: Estefany Araujo RN  Sent: 5/31/2017   4:36 PM  To: KAZ Rodriguez MD Aimee Keating, RN         She is done with this medication   Previous Messages        ----- Message -----   From: Estefany Araujo RN   Sent: 5/30/2017   3:20 PM   To: MD Dr. Alexandria Guerra,   Pt requesting refill of Percocet. Pt. Seen on 4/25. Has follow up appointment on 7/25.     Thank you,   Estefany Araujo RN   ----- Message -----   From: Jo-Ann Dee   Sent: 5/30/2017   2:11 PM   To: Alexandria Gotti Staff     X_  1st Request   _  2nd Request   _  3rd Request     Please refill the medication(s) listed below. Please call the patient when the prescription(s) is ready for  at the phone number [phone].     Medication #1   oxycodone-acetaminophen (PERCOCET) 5-325 mg per tablet 20 tablet 0 4/25/2017 No   Sig - Route: Take 1 tablet by mouth 2 (two) times daily as needed for Pain. - Oral   Class: Print   Order: [id number]

## 2017-06-05 ENCOUNTER — TELEPHONE (OUTPATIENT)
Dept: PAIN MEDICINE | Facility: CLINIC | Age: 62
End: 2017-06-05

## 2017-06-05 NOTE — TELEPHONE ENCOUNTER
----- Message from Ana María Ibrahim MD sent at 6/5/2017 12:35 PM CDT -----  Ambrose does she know I m on vacation  ----- Message -----  From: Ambrose Clements MA  Sent: 6/1/2017   5:32 PM  To: MD Poncho Guerra, Dr Ibrahim spoke with pt about refill request for of Percocet. Pt. Seen on 4/25. Has follow up appointment on 7/25. Patient states you only gave he 20 pills and snd she's leaving to go out of town. Pt wants to know if she can have a refill because she want see you until July. Please advise.

## 2017-06-06 RX ORDER — PRAVASTATIN SODIUM 40 MG/1
40 TABLET ORAL DAILY
Qty: 30 TABLET | Refills: 5 | Status: SHIPPED | OUTPATIENT
Start: 2017-06-06 | End: 2017-09-01 | Stop reason: SDUPTHER

## 2017-07-23 RX ORDER — LISINOPRIL 20 MG/1
20 TABLET ORAL DAILY
Qty: 30 TABLET | Refills: 5 | Status: SHIPPED | OUTPATIENT
Start: 2017-07-23 | End: 2017-12-24 | Stop reason: SDUPTHER

## 2017-07-25 ENCOUNTER — OFFICE VISIT (OUTPATIENT)
Dept: PAIN MEDICINE | Facility: CLINIC | Age: 62
End: 2017-07-25
Payer: COMMERCIAL

## 2017-07-25 VITALS
RESPIRATION RATE: 20 BRPM | WEIGHT: 118.81 LBS | DIASTOLIC BLOOD PRESSURE: 75 MMHG | BODY MASS INDEX: 21.05 KG/M2 | SYSTOLIC BLOOD PRESSURE: 123 MMHG | TEMPERATURE: 98 F | HEIGHT: 63 IN | HEART RATE: 80 BPM

## 2017-07-25 DIAGNOSIS — F32.2 MAJOR DEPRESSIVE DISORDER, SINGLE EPISODE, SEVERE WITHOUT PSYCHOTIC FEATURES: ICD-10-CM

## 2017-07-25 PROCEDURE — 99999 PR PBB SHADOW E&M-EST. PATIENT-LVL III: CPT | Mod: PBBFAC,,, | Performed by: PSYCHIATRY & NEUROLOGY

## 2017-07-25 PROCEDURE — 99214 OFFICE O/P EST MOD 30 MIN: CPT | Mod: S$GLB,,, | Performed by: PSYCHIATRY & NEUROLOGY

## 2017-07-25 RX ORDER — DULOXETIN HYDROCHLORIDE 60 MG/1
60 CAPSULE, DELAYED RELEASE ORAL DAILY
Qty: 60 CAPSULE | Refills: 2 | Status: SHIPPED | OUTPATIENT
Start: 2017-07-25 | End: 2017-10-09 | Stop reason: SDUPTHER

## 2017-07-25 RX ORDER — OXYCODONE AND ACETAMINOPHEN 5; 325 MG/1; MG/1
1 TABLET ORAL 2 TIMES DAILY PRN
Qty: 30 TABLET | Refills: 0 | Status: SHIPPED | OUTPATIENT
Start: 2017-07-25 | End: 2017-10-09 | Stop reason: SDUPTHER

## 2017-07-25 RX ORDER — OXYCODONE AND ACETAMINOPHEN 5; 325 MG/1; MG/1
1 TABLET ORAL 2 TIMES DAILY PRN
Qty: 20 TABLET | Refills: 0 | Status: SHIPPED | OUTPATIENT
Start: 2017-07-25 | End: 2017-07-25 | Stop reason: SDUPTHER

## 2017-07-25 RX ORDER — GABAPENTIN 300 MG/1
300 CAPSULE ORAL 3 TIMES DAILY
Qty: 120 CAPSULE | Refills: 2 | Status: SHIPPED | OUTPATIENT
Start: 2017-07-25 | End: 2018-04-17 | Stop reason: SDUPTHER

## 2017-07-25 NOTE — PROGRESS NOTES
"Outpatient Psychiatry Follow-Up Visit (MD/NP)    7/25/2017    Clinical Status of Patient:  Outpatient (Ambulatory)    Chief Complaint:  Didi Angeles is a 61 y.o. female who presents today for follow-up of depression.  Met with patient.      Interval History and Content of Current Session:  Interim Events/Subjective Report/Content of Current Session: Pt reports that she has been taking the cymbalta and the gabapentin  And that she has been doing "OK". That with her mother not doing well and she has to drive to go help her every couple of weeks, she has been having more pain and that sometimes the pain gets unbearable. That she did use the percocet as needed and when she takes on it does take the edge off. That she is aware of the issues with chronic opioid therapy but would like to have ssome pain medication for when the pain gets too severe. She was able to taper off and reports that she does not want to start daily opioid use again. She has been using other coping skills ie meditation.      Psychotherapy:  · Target symptoms: depression, chronic pain  · Why chosen therapy is appropriate versus another modality: relevant to diagnosis, patient responds to this modality  · Outcome monitoring methods: self-report, observation  · Therapeutic intervention type: behavior modifying psychotherapy, supportive psychotherapy  · Topics discussed/themes: stress related to medical comorbidities, difficulty managing affect in interpersonal relationships, building skills sets for symptom management, symptom recognition  · The patient's response to the intervention is accepting. The patient's progress toward treatment goals is fair.   · Duration of intervention: 30 minutes.    Review of Systems   · PSYCHIATRIC: Pertinant items are noted in the narrative.    Past Medical, Family and Social History: The patient's past medical, family and social history have been reviewed and updated as appropriate within the electronic medical " "record - see encounter notes.    Compliance: yes    Side effects: None    Risk Parameters:  Patient reports no suicidal ideation  Patient reports no homicidal ideation  Patient reports no self-injurious behavior  Patient reports no violent behavior    Exam (detailed: at least 9 elements; comprehensive: all 15 elements)   Constitutional  Vitals:  Most recent vital signs, dated less than 90 days prior to this appointment, were reviewed.   Vitals:    07/25/17 0932   BP: 123/75   Pulse: 80   Resp: 20   Temp: 97.9 °F (36.6 °C)   TempSrc: Oral   Weight: 53.9 kg (118 lb 13.3 oz)   Height: 5' 2.5" (1.588 m)        General:  age appropriate, casually dressed, neatly groomed     Musculoskeletal  Muscle Strength/Tone:  no tremor, no tic   Gait & Station:  non-ataxic     Psychiatric  Speech:  no latency; no press   Mood & Affect:  " in pain"  congruent and appropriate   Thought Process:  normal and logical, goal-directed   Associations:  intact   Thought Content:  normal, no suicidality, no homicidality, delusions, or paranoia   Insight:  intact   Judgement: behavior is adequate to circumstances   Orientation:  grossly intact   Memory: intact for content of interview   Language: grossly intact   Attention Span & Concentration:  able to focus   Fund of Knowledge:  intact and appropriate to age and level of education     Assessment and Diagnosis   Status/Progress: Based on the examination today, the patient's problem(s) is/are inadequately controlled.  New problems have been presented today.   Co-morbidities are complicating management of the primary condition.  There are no active rule-out diagnoses for this patient at this time.       Impression: Pt is a 62 Y/O CW with PMHx sig for Chronic low back pain , OA with        Opioid dependence in efr.  Major depressive disorder, mod, recurrent          Strengths and Liabilities: Strength: Patient accepts guidance/feedback, Strength: Patient is expressive/articulate., Strength: " Patient is intelligent., Strength: Patient is motivated for change., Strength: Patient has positive support network., Strength: Patient has reasonable judgment.      Treatment Goals: Specify outcomes written in observable, behavioral terms:    Depression: acquiring relapse prevention skills, eliminating all depressive symptoms (BDI score <10 for 1 month), increasing energy, increasing interest in usual activities, increasing motivation, increasing self-reward for positive behaviors (one/day), increasing self-reward for positive thoughts (one/day), increasing social contacts (three/week), reducing excessive guilt, reducing fatigue and reducing negative automatic thoughts      Treatment Plan/Recommendations:    · Medication Management: Continue current medications. The risks and benefits of medication were discussed with the patient.  · Referral for further treatment to social work team for psychotherapy  · The treatment plan and follow up plan were reviewed with the patient.  Will cont the cymbalta 60 mg daily.  Will cont percocet 5 mg bid prn severe pain #30  Will cont neurontin 300 mg in AM and noon and 600 mg at bedtime.   Discussed coping skills.  Discussed cont to use CBT.  Provided supportive psychotherapy.          Return to Clinic: 3 months

## 2017-08-06 ENCOUNTER — HOSPITAL ENCOUNTER (EMERGENCY)
Facility: OTHER | Age: 62
Discharge: HOME | End: 2017-08-06
Attending: EMERGENCY MEDICINE
Payer: COMMERCIAL

## 2017-08-06 VITALS
RESPIRATION RATE: 18 BRPM | OXYGEN SATURATION: 98 % | HEART RATE: 90 BPM | DIASTOLIC BLOOD PRESSURE: 70 MMHG | TEMPERATURE: 99 F | HEIGHT: 62 IN | WEIGHT: 118 LBS | SYSTOLIC BLOOD PRESSURE: 122 MMHG | BODY MASS INDEX: 21.71 KG/M2

## 2017-08-06 DIAGNOSIS — H11.32 SUBCONJUNCTIVAL HEMORRHAGE, LEFT: Primary | ICD-10-CM

## 2017-08-06 DIAGNOSIS — R00.0 TACHYCARDIA: ICD-10-CM

## 2017-08-06 DIAGNOSIS — S05.02XA CORNEAL ABRASION, LEFT, INITIAL ENCOUNTER: ICD-10-CM

## 2017-08-06 PROCEDURE — 93010 ELECTROCARDIOGRAM REPORT: CPT | Mod: ,,, | Performed by: INTERNAL MEDICINE

## 2017-08-06 PROCEDURE — 99283 EMERGENCY DEPT VISIT LOW MDM: CPT

## 2017-08-06 PROCEDURE — 25000003 PHARM REV CODE 250: Performed by: PHYSICIAN ASSISTANT

## 2017-08-06 RX ORDER — PROPARACAINE HYDROCHLORIDE 5 MG/ML
1 SOLUTION/ DROPS OPHTHALMIC
Status: COMPLETED | OUTPATIENT
Start: 2017-08-06 | End: 2017-08-06

## 2017-08-06 RX ORDER — ERYTHROMYCIN 5 MG/G
OINTMENT OPHTHALMIC
Qty: 1 TUBE | Refills: 0 | Status: SHIPPED | OUTPATIENT
Start: 2017-08-06 | End: 2019-12-12 | Stop reason: ALTCHOICE

## 2017-08-06 RX ORDER — ERYTHROMYCIN 5 MG/G
OINTMENT OPHTHALMIC
Status: COMPLETED | OUTPATIENT
Start: 2017-08-06 | End: 2017-08-06

## 2017-08-06 RX ADMIN — FLUORESCEIN SODIUM 1 STRIP: 1 STRIP OPHTHALMIC at 05:08

## 2017-08-06 RX ADMIN — ERYTHROMYCIN 1 INCH: 5 OINTMENT OPHTHALMIC at 05:08

## 2017-08-06 RX ADMIN — PROPARACAINE HYDROCHLORIDE 1 DROP: 5 SOLUTION/ DROPS OPHTHALMIC at 04:08

## 2017-08-06 NOTE — ED TRIAGE NOTES
Pt reports left sided headache with redness to left eye; some blurred vision reported; denies any dizziness/lightheadedness; history of anxiety/depression

## 2017-08-06 NOTE — ED PROVIDER NOTES
Encounter Date: 8/6/2017       History     Chief Complaint   Patient presents with    Eye Problem     c/o left side headache since this am     Patient is a 61-year-old female with history of anxiety, depression, hyperlipidemia, and hypertension who presents to the emergency department with left eye redness.  Patient states he woke up this morning feeling and irritation in the left eye.  Patient states she rubbed her eye.  Patient states when she went to the bathroom, she noticed that the eye was very red.  He states that she became very anxious and worried that her brain was bleeding into her eye.  She denies visual disturbance.  She states she did develop a slight headache on the left side of her head at that time.  She states the headache has since resolved.  She denies facial asymmetry, speech slur, or extremity weakness.  She denies any fevers or chills.  She states she does not work contacts.  She denies any pain to the left eye currently.  She states that she just has an irritation in the left eye.      The history is provided by the patient.     Review of patient's allergies indicates:   Allergen Reactions    Compazine [prochlorperazine edisylate] Nausea Only    Sulfa (sulfonamide antibiotics)     Pcn [penicillins] Rash     Past Medical History:   Diagnosis Date    Anxiety     Arthritis     Depression     Hyperlipidemia     Hypertension      Past Surgical History:   Procedure Laterality Date    APPENDECTOMY      COSMETIC SURGERY      reduction    HYSTERECTOMY      TONSILLECTOMY       Family History   Problem Relation Age of Onset    Dementia Mother     Cancer Mother      breast    Heart disease Father     Heart disease Maternal Grandfather      Social History   Substance Use Topics    Smoking status: Former Smoker    Smokeless tobacco: Never Used    Alcohol use Not on file     Review of Systems   Constitutional: Negative for activity change, appetite change, chills, fatigue and fever.    HENT: Negative for congestion, ear discharge, ear pain, nosebleeds, postnasal drip, rhinorrhea, sore throat and trouble swallowing.    Eyes: Positive for redness. Negative for photophobia, discharge and visual disturbance.   Respiratory: Negative for cough and shortness of breath.    Cardiovascular: Negative for chest pain.   Gastrointestinal: Negative for abdominal pain, blood in stool, constipation, diarrhea, nausea and vomiting.   Genitourinary: Negative for dysuria, flank pain and hematuria.   Musculoskeletal: Negative for back pain, neck pain and neck stiffness.   Skin: Negative for rash and wound.   Neurological: Positive for headaches. Negative for dizziness, speech difficulty, weakness, light-headedness and numbness.       Physical Exam     Initial Vitals [08/06/17 1556]   BP Pulse Resp Temp SpO2   133/71 (!) 131 18 99.8 °F (37.7 °C) (!) 94 %      MAP       91.67         Physical Exam    Nursing note and vitals reviewed.  Constitutional: She appears well-developed and well-nourished. She is not diaphoretic.  Non-toxic appearance.   anxious   HENT:   Head: Normocephalic.   Right Ear: Hearing and external ear normal.   Left Ear: Hearing and external ear normal.   Nose: Nose normal.   Mouth/Throat: Uvula is midline, oropharynx is clear and moist and mucous membranes are normal. No trismus in the jaw. No oropharyngeal exudate.   Eyes: EOM are normal. Pupils are equal, round, and reactive to light. Right eye exhibits no discharge. Left eye exhibits no discharge. Left conjunctiva has a hemorrhage. No scleral icterus.   Linear fluorescein uptake center of cornea     IOP in left eye: 13   Neck: Normal range of motion.   Cardiovascular: Regular rhythm and normal heart sounds.   tachycardia   Pulmonary/Chest: Breath sounds normal. No respiratory distress. She has no wheezes. She has no rhonchi. She has no rales. She exhibits no tenderness.   Abdominal: Soft. Bowel sounds are normal. She exhibits no distension and no  mass. There is no tenderness. There is no rebound and no guarding.   Lymphadenopathy:     She has no cervical adenopathy.   Neurological: She is alert and oriented to person, place, and time. She has normal strength. No cranial nerve deficit or sensory deficit. She displays a negative Romberg sign. Coordination and gait normal.   Skin: Skin is warm and dry. Capillary refill takes less than 2 seconds.   Psychiatric: She has a normal mood and affect.         ED Course   Procedures  Labs Reviewed - No data to display  EKG Readings: (Independently Interpreted)   Initial Reading: No STEMI. Heart Rate: 95.          Medical Decision Making:   Initial Assessment:   Urgent evaluation of 61-year-old female with history of anxiety, depression, hyperlipidemia, and hypertension who presents to the emergency department with left eye redness.  Patient is afebrile, nontoxic appearing, and hemodynamically stable.  On initial exam, patient is very anxious.  She is tachycardic.  EKG is obtained and shows sinus tach.  Patient comes down, her heart rate improved drastically into the 90s.  Neuro exam is unremarkable.  On exam of the left eye, there is no foreign body.  There is normal pressure in left eye.  Small corneal abrasion noted in left eye with subconjunctival hemorrhage.  Do not suspect intracranial abnormality.  I do not feel that further workup is warranted here.  Patient be discharged with prophylactic antibiotics for corneal abrasion.  She is advised to follow-up with ophthalmology next week for reevaluation.  She is advised to return for any worsening symptoms or concerns.  ED Management:  Case is discussed with supervising physician who agrees with plan.  Other:   I have discussed this case with another health care provider.       <> Summary of the Discussion: Dr. Dodd                   ED Course     Clinical Impression:   The primary encounter diagnosis was Subconjunctival hemorrhage, left. Diagnoses of Tachycardia  and Corneal abrasion, left, initial encounter were also pertinent to this visit.                           Wendie Acosta PA-C  08/06/17 1742

## 2017-09-01 RX ORDER — PRAVASTATIN SODIUM 40 MG/1
40 TABLET ORAL DAILY
Qty: 90 TABLET | Refills: 0 | Status: SHIPPED | OUTPATIENT
Start: 2017-09-01 | End: 2018-02-21 | Stop reason: SDUPTHER

## 2017-09-27 DIAGNOSIS — F32.2 MAJOR DEPRESSIVE DISORDER, SINGLE EPISODE, SEVERE WITHOUT PSYCHOTIC FEATURES: ICD-10-CM

## 2017-09-27 RX ORDER — GABAPENTIN 300 MG/1
CAPSULE ORAL
Qty: 120 CAPSULE | Refills: 2 | Status: SHIPPED | OUTPATIENT
Start: 2017-09-27 | End: 2017-10-09 | Stop reason: SDUPTHER

## 2017-10-05 ENCOUNTER — TELEPHONE (OUTPATIENT)
Dept: PAIN MEDICINE | Facility: CLINIC | Age: 62
End: 2017-10-05

## 2017-10-05 NOTE — TELEPHONE ENCOUNTER
----- Message from Juan Baeza sent at 10/5/2017 10:26 AM CDT -----  Contact: Didi Angeles      _X  1st Request  _  2nd Request  _  3rd Request    Please refill the medication(s) listed below. Please call the patient when the prescription(s) is ready for  at this phone number   [phone]         Medication #1    oxycodone-acetaminophen (PERCOCET) 5-325 mg per tablet 30 tablet 0 7/25/2017  No  Sig - Route: Take 1 tablet by mouth 2 (two) times daily as needed for Pain. - Oral  Class: Print  Order: [id number]  Date/Time Signed: 7/25/2017 09:58            Medication #2      Preferred Pharmacy:

## 2017-10-05 NOTE — TELEPHONE ENCOUNTER
Called and spoke with pt today and schedule her for follow up with you for 10/9/17 for medication.

## 2017-10-09 ENCOUNTER — OFFICE VISIT (OUTPATIENT)
Dept: PAIN MEDICINE | Facility: CLINIC | Age: 62
End: 2017-10-09
Payer: COMMERCIAL

## 2017-10-09 VITALS
SYSTOLIC BLOOD PRESSURE: 115 MMHG | RESPIRATION RATE: 18 BRPM | HEIGHT: 62 IN | HEART RATE: 70 BPM | WEIGHT: 118 LBS | BODY MASS INDEX: 21.71 KG/M2 | DIASTOLIC BLOOD PRESSURE: 78 MMHG | TEMPERATURE: 98 F

## 2017-10-09 DIAGNOSIS — F32.2 MAJOR DEPRESSIVE DISORDER, SINGLE EPISODE, SEVERE WITHOUT PSYCHOTIC FEATURES: ICD-10-CM

## 2017-10-09 PROCEDURE — 99999 PR PBB SHADOW E&M-EST. PATIENT-LVL III: CPT | Mod: PBBFAC,,, | Performed by: PSYCHIATRY & NEUROLOGY

## 2017-10-09 PROCEDURE — 99214 OFFICE O/P EST MOD 30 MIN: CPT | Mod: S$GLB,,, | Performed by: PSYCHIATRY & NEUROLOGY

## 2017-10-09 RX ORDER — OXYCODONE AND ACETAMINOPHEN 5; 325 MG/1; MG/1
1 TABLET ORAL 2 TIMES DAILY PRN
Qty: 30 TABLET | Refills: 0 | Status: SHIPPED | OUTPATIENT
Start: 2017-11-09 | End: 2017-10-09 | Stop reason: SDUPTHER

## 2017-10-09 RX ORDER — DULOXETIN HYDROCHLORIDE 60 MG/1
60 CAPSULE, DELAYED RELEASE ORAL DAILY
Qty: 60 CAPSULE | Refills: 2 | Status: SHIPPED | OUTPATIENT
Start: 2017-10-09 | End: 2018-04-17 | Stop reason: SDUPTHER

## 2017-10-09 RX ORDER — GABAPENTIN 300 MG/1
CAPSULE ORAL
Qty: 120 CAPSULE | Refills: 2 | Status: SHIPPED | OUTPATIENT
Start: 2017-10-09 | End: 2018-04-17 | Stop reason: SDUPTHER

## 2017-10-09 RX ORDER — DULOXETIN HYDROCHLORIDE 60 MG/1
60 CAPSULE, DELAYED RELEASE ORAL DAILY
Qty: 60 CAPSULE | Refills: 2 | Status: SHIPPED | OUTPATIENT
Start: 2017-10-09 | End: 2017-10-09 | Stop reason: SDUPTHER

## 2017-10-09 RX ORDER — OXYCODONE AND ACETAMINOPHEN 5; 325 MG/1; MG/1
1 TABLET ORAL 2 TIMES DAILY PRN
Qty: 30 TABLET | Refills: 0 | Status: SHIPPED | OUTPATIENT
Start: 2017-10-09 | End: 2017-10-09 | Stop reason: SDUPTHER

## 2017-10-09 RX ORDER — OXYCODONE AND ACETAMINOPHEN 5; 325 MG/1; MG/1
1 TABLET ORAL 2 TIMES DAILY PRN
Qty: 30 TABLET | Refills: 0 | Status: SHIPPED | OUTPATIENT
Start: 2017-12-09 | End: 2018-04-17

## 2017-10-09 NOTE — PROGRESS NOTES
Outpatient Psychiatry Follow-Up Visit (MD/NP)    10/9/2017    Clinical Status of Patient:  Outpatient (Ambulatory)    Chief Complaint:  Didi Angeles is a 61 y.o. female who presents today for follow-up of depression.  Met with patient.      Interval History and Content of Current Session:  Interim Events/Subjective Report/Content of Current Session: Pt reports that she has been taking the cymbalta and the gabapentin  That she continues to deal with the chronic pain and has good days and bad days. That she is using the coping skills that we have been discussing and is doing her best to continue to do things that she needs to do to help herself. That she is still very involved with caring for her mother and that involves her driving to [place]. We talked about her following up with Dr Bae , Pt reprts that she does not want to srat chronic opioid therapy again but nees to have some pain medication from time to time. Pt has not had any issues with abuse/addiction.       Psychotherapy:  · Target symptoms: depression, chronic pain  · Why chosen therapy is appropriate versus another modality: relevant to diagnosis, patient responds to this modalitystart back on chronic opi  · Outcome monitoring methods: self-report, observation  · Therapeutic intervention type: behavior modifying psychotherapy, supportive psychotherapy  · Topics discussed/themes: stress related to medical comorbidities, difficulty managing affect in interpersonal relationships, building skills sets for symptom management, symptom recognition  · The patient's response to the intervention is accepting. The patient's progress toward treatment goals is fair.   · Duration of intervention: 30 minutes.    Review of Systems   · PSYCHIATRIC: Pertinant items are noted in the narrative.    Past Medical, Family and Social History: The patient's past medical, family and social history have been reviewed and updated as appropriate within the electronic medical  "record - see encounter notes.    Compliance: yes    Side effects: None    Risk Parameters:  Patient reports no suicidal ideation  Patient reports no homicidal ideation  Patient reports no self-injurious behavior  Patient reports no violent behavior    Exam (detailed: at least 9 elements; comprehensive: all 15 elements)   Constitutional  Vitals:  Most recent vital signs, dated less than 90 days prior to this appointment, were reviewed.   There were no vitals filed for this visit.     General:  age appropriate, casually dressed, neatly groomed     Musculoskeletal  Muscle Strength/Tone:  no tremor, no tic   Gait & Station:  non-ataxic     Psychiatric  Speech:  no latency; no press   Mood & Affect:  " in pain"  congruent and appropriate   Thought Process:  normal and logical, goal-directed   Associations:  intact   Thought Content:  normal, no suicidality, no homicidality, delusions, or paranoia   Insight:  intact   Judgement: behavior is adequate to circumstances   Orientation:  grossly intact   Memory: intact for content of interview   Language: grossly intact   Attention Span & Concentration:  able to focus   Fund of Knowledge:  intact and appropriate to age and level of education     Assessment and Diagnosis   Status/Progress: Based on the examination today, the patient's problem(s) is/are inadequately controlled.  New problems have been presented today.   Co-morbidities are complicating management of the primary condition.  There are no active rule-out diagnoses for this patient at this time.       Impression: Pt is a 62 Y/O CW with PMHx sig for Chronic low back pain , OA with        Opioid dependence in efr.  Major depressive disorder, mod, recurrent          Strengths and Liabilities: Strength: Patient accepts guidance/feedback, Strength: Patient is expressive/articulate., Strength: Patient is intelligent., Strength: Patient is motivated for change., Strength: Patient has positive support network., Strength: " Patient has reasonable judgment.      Treatment Goals: Specify outcomes written in observable, behavioral terms:    Depression: acquiring relapse prevention skills, eliminating all depressive symptoms (BDI score <10 for 1 month), increasing energy, increasing interest in usual activities, increasing motivation, increasing self-reward for positive behaviors (one/day), increasing self-reward for positive thoughts (one/day), increasing social contacts (three/week), reducing excessive guilt, reducing fatigue and reducing negative automatic thoughts      Treatment Plan/Recommendations:    · Medication Management: Continue current medications. The risks and benefits of medication were discussed with the patient.  · Referral for further treatment to social work team for psychotherapy  · The treatment plan and follow up plan were reviewed with the patient.  Will cont the cymbalta 60 mg daily.  Will cont percocet 5 mg bid prn severe pain.   Will cont neurontin 300 mg in AM and noon and 600 mg at bedtime.   Discussed coping skills.  Discussed cont to use CBT.  Provided supportive psychotherapy.  Have discussed with pt that I am leaving [facility].         Return to Clinic: Pt will follow up with Dr Bae

## 2017-11-03 ENCOUNTER — TELEPHONE (OUTPATIENT)
Dept: INTERNAL MEDICINE | Facility: CLINIC | Age: 62
End: 2017-11-03

## 2017-11-03 DIAGNOSIS — N63.0 BREAST LUMP: Primary | ICD-10-CM

## 2017-11-03 NOTE — TELEPHONE ENCOUNTER
----- Message from Erika Judd sent at 11/3/2017  9:36 AM CDT -----  Contact: self  Pt needs an order for a mammogram put into epic.  She has found a lump on her breast and is concerned.  She would like to get it checked asap.       Pt can be reached at [phone]

## 2017-11-03 NOTE — TELEPHONE ENCOUNTER
Orders for diag MMG placed and also a consult w/ Aparna Sheets in the breast center for exam. Thank you.

## 2017-11-09 ENCOUNTER — HOSPITAL ENCOUNTER (OUTPATIENT)
Dept: RADIOLOGY | Facility: HOSPITAL | Age: 62
Discharge: HOME | End: 2017-11-09
Attending: FAMILY MEDICINE
Payer: COMMERCIAL

## 2017-11-09 VITALS — WEIGHT: 118 LBS | HEIGHT: 62 IN | BODY MASS INDEX: 21.71 KG/M2

## 2017-11-09 DIAGNOSIS — N63.0 BREAST LUMP: ICD-10-CM

## 2017-11-09 PROCEDURE — 76642 ULTRASOUND BREAST LIMITED: CPT | Mod: 26,RT,, | Performed by: RADIOLOGY

## 2017-11-09 PROCEDURE — 76642 ULTRASOUND BREAST LIMITED: CPT | Mod: TC,RT

## 2017-11-09 PROCEDURE — 77066 DX MAMMO INCL CAD BI: CPT | Mod: 26,,, | Performed by: RADIOLOGY

## 2017-11-09 PROCEDURE — 77062 BREAST TOMOSYNTHESIS BI: CPT | Mod: TC

## 2017-11-09 PROCEDURE — 77062 BREAST TOMOSYNTHESIS BI: CPT | Mod: 26,,, | Performed by: RADIOLOGY

## 2017-12-26 RX ORDER — LISINOPRIL 20 MG/1
20 TABLET ORAL DAILY
Qty: 90 TABLET | Refills: 0 | Status: SHIPPED | OUTPATIENT
Start: 2017-12-26 | End: 2018-03-24 | Stop reason: SDUPTHER

## 2018-02-21 RX ORDER — PRAVASTATIN SODIUM 40 MG/1
40 TABLET ORAL DAILY
Qty: 90 TABLET | Refills: 0 | Status: SHIPPED | OUTPATIENT
Start: 2018-02-21 | End: 2018-04-17 | Stop reason: SDUPTHER

## 2018-03-24 DIAGNOSIS — Z00.00 ANNUAL PHYSICAL EXAM: Primary | ICD-10-CM

## 2018-03-24 DIAGNOSIS — I10 HYPERTENSION, ESSENTIAL: ICD-10-CM

## 2018-03-24 DIAGNOSIS — E78.5 HYPERLIPIDEMIA, UNSPECIFIED HYPERLIPIDEMIA TYPE: ICD-10-CM

## 2018-03-26 RX ORDER — LISINOPRIL 20 MG/1
20 TABLET ORAL DAILY
Qty: 90 TABLET | Refills: 0 | Status: SHIPPED | OUTPATIENT
Start: 2018-03-26 | End: 2018-04-17 | Stop reason: SDUPTHER

## 2018-03-26 NOTE — TELEPHONE ENCOUNTER
Called pt no answer left voicemail requesting a call back in regards to scheduling follow up appointment

## 2018-04-05 ENCOUNTER — OUTPATIENT CASE MANAGEMENT (OUTPATIENT)
Dept: ADMINISTRATIVE | Facility: OTHER | Age: 63
End: 2018-04-05

## 2018-04-05 NOTE — PROGRESS NOTES
Thank you for the referral. The following patient has been assigned to Rosey Cordero RN with Outpatient Complex Care Management for high risk screening.    Reason for referral: [facility]    Please contact [facility] at [phone] with any questions.    Thank you,    Ema Wyatt, [facility] [place] [facility] Mgmt  Ext [phone]

## 2018-04-13 ENCOUNTER — OUTPATIENT CASE MANAGEMENT (OUTPATIENT)
Dept: ADMINISTRATIVE | Facility: OTHER | Age: 63
End: 2018-04-13

## 2018-04-16 ENCOUNTER — OUTPATIENT CASE MANAGEMENT (OUTPATIENT)
Dept: ADMINISTRATIVE | Facility: OTHER | Age: 63
End: 2018-04-16

## 2018-04-16 NOTE — LETTER
April 16, 2018    Didi Angeles  [address] [facility] [place] [address]             [facility]  [address]  [facility] [address] Dear Didi Angeles:    I work with [facility]'s Outpatient Case Management Department. We received a referral to call you to discuss your medical history. These services are free of charge and are offered to [facility] patients who have recently been discharged from any of our facilities or who have complex medical conditions that may require the skill of a nurse to assist with management.             I am a Registered Nurse who specializes in connecting patients with available medical and financial resources as well as addressing any educational needs that may be indicated.      I attempted to reach you by telephone, but I was unsuccessful. Please call our department so that we can go over some questions with you regarding your health.    The Outpatient Case Management Department can be reached at [phone] from 8:00AM to 4:30 PM on Monday thru Friday. [facility] also has a program where a nurse is available 24/7 to answer questions or provide medical advice, their number is [phone].    Thanks,      KAZ Roblero  Outpatient Complex Case Management

## 2018-04-16 NOTE — PROGRESS NOTES
4/16/2018  Second attempt to complete follow up for Outpatient Care Management, left message requesting a return call.  Will mail letter requesting a return call.  KAZ Roblero

## 2018-04-17 ENCOUNTER — LAB VISIT (OUTPATIENT)
Dept: LAB | Facility: HOSPITAL | Age: 63
End: 2018-04-17
Attending: FAMILY MEDICINE
Payer: COMMERCIAL

## 2018-04-17 ENCOUNTER — OFFICE VISIT (OUTPATIENT)
Dept: INTERNAL MEDICINE | Facility: CLINIC | Age: 63
End: 2018-04-17
Attending: FAMILY MEDICINE
Payer: COMMERCIAL

## 2018-04-17 VITALS
HEART RATE: 96 BPM | WEIGHT: 111.63 LBS | BODY MASS INDEX: 19.78 KG/M2 | DIASTOLIC BLOOD PRESSURE: 60 MMHG | SYSTOLIC BLOOD PRESSURE: 112 MMHG | HEIGHT: 63 IN

## 2018-04-17 DIAGNOSIS — E78.5 HYPERLIPIDEMIA, UNSPECIFIED HYPERLIPIDEMIA TYPE: ICD-10-CM

## 2018-04-17 DIAGNOSIS — M15.9 PRIMARY OSTEOARTHRITIS INVOLVING MULTIPLE JOINTS: ICD-10-CM

## 2018-04-17 DIAGNOSIS — Z12.11 COLON CANCER SCREENING: ICD-10-CM

## 2018-04-17 DIAGNOSIS — Z00.00 ANNUAL PHYSICAL EXAM: ICD-10-CM

## 2018-04-17 DIAGNOSIS — G89.29 CHRONIC LOW BACK PAIN, UNSPECIFIED BACK PAIN LATERALITY, WITH SCIATICA PRESENCE UNSPECIFIED: ICD-10-CM

## 2018-04-17 DIAGNOSIS — F32.2 MAJOR DEPRESSIVE DISORDER, SINGLE EPISODE, SEVERE WITHOUT PSYCHOTIC FEATURES: ICD-10-CM

## 2018-04-17 DIAGNOSIS — M54.5 CHRONIC LOW BACK PAIN, UNSPECIFIED BACK PAIN LATERALITY, WITH SCIATICA PRESENCE UNSPECIFIED: ICD-10-CM

## 2018-04-17 DIAGNOSIS — Z00.00 ANNUAL PHYSICAL EXAM: Primary | ICD-10-CM

## 2018-04-17 DIAGNOSIS — M47.816 FACET ARTHROPATHY, LUMBAR: ICD-10-CM

## 2018-04-17 DIAGNOSIS — I10 HYPERTENSION, ESSENTIAL: ICD-10-CM

## 2018-04-17 LAB
ALBUMIN SERPL BCP-MCNC: 4.3 G/DL
ALP SERPL-CCNC: 72 U/L
ALT SERPL W/O P-5'-P-CCNC: 13 U/L
ANION GAP SERPL CALC-SCNC: 10 MMOL/L
AST SERPL-CCNC: 21 U/L
BILIRUB SERPL-MCNC: 0.3 MG/DL
BUN SERPL-MCNC: 19 MG/DL
CALCIUM SERPL-MCNC: 9.6 MG/DL
CHLORIDE SERPL-SCNC: 105 MMOL/L
CHOLEST SERPL-MCNC: 187 MG/DL
CHOLEST/HDLC SERPL: 3.1 {RATIO}
CO2 SERPL-SCNC: 26 MMOL/L
CREAT SERPL-MCNC: 0.9 MG/DL
EST. GFR  (AFRICAN AMERICAN): >60 ML/MIN/1.73 M^2
EST. GFR  (NON AFRICAN AMERICAN): >60 ML/MIN/1.73 M^2
GLUCOSE SERPL-MCNC: 102 MG/DL
HDLC SERPL-MCNC: 61 MG/DL
HDLC SERPL: 32.6 %
LDLC SERPL CALC-MCNC: 98.8 MG/DL
NONHDLC SERPL-MCNC: 126 MG/DL
POTASSIUM SERPL-SCNC: 4.3 MMOL/L
PROT SERPL-MCNC: 7.2 G/DL
SODIUM SERPL-SCNC: 141 MMOL/L
TRIGL SERPL-MCNC: 136 MG/DL

## 2018-04-17 PROCEDURE — 80053 COMPREHEN METABOLIC PANEL: CPT

## 2018-04-17 PROCEDURE — 3074F SYST BP LT 130 MM HG: CPT | Mod: CPTII,S$GLB,, | Performed by: FAMILY MEDICINE

## 2018-04-17 PROCEDURE — 99396 PREV VISIT EST AGE 40-64: CPT | Mod: S$GLB,,, | Performed by: FAMILY MEDICINE

## 2018-04-17 PROCEDURE — 3078F DIAST BP <80 MM HG: CPT | Mod: CPTII,S$GLB,, | Performed by: FAMILY MEDICINE

## 2018-04-17 PROCEDURE — 99999 PR PBB SHADOW E&M-EST. PATIENT-LVL III: CPT | Mod: PBBFAC,,, | Performed by: FAMILY MEDICINE

## 2018-04-17 PROCEDURE — 36415 COLL VENOUS BLD VENIPUNCTURE: CPT

## 2018-04-17 PROCEDURE — 80061 LIPID PANEL: CPT

## 2018-04-17 RX ORDER — DULOXETIN HYDROCHLORIDE 60 MG/1
60 CAPSULE, DELAYED RELEASE ORAL DAILY
Qty: 90 CAPSULE | Refills: 1 | Status: SHIPPED | OUTPATIENT
Start: 2018-04-17 | End: 2018-09-24 | Stop reason: SDUPTHER

## 2018-04-17 RX ORDER — GABAPENTIN 300 MG/1
300 CAPSULE ORAL 3 TIMES DAILY
Qty: 270 CAPSULE | Refills: 1 | Status: SHIPPED | OUTPATIENT
Start: 2018-04-17 | End: 2018-10-17 | Stop reason: SDUPTHER

## 2018-04-17 RX ORDER — PRAVASTATIN SODIUM 40 MG/1
40 TABLET ORAL DAILY
Qty: 90 TABLET | Refills: 3 | Status: SHIPPED | OUTPATIENT
Start: 2018-04-17 | End: 2019-02-22 | Stop reason: SDUPTHER

## 2018-04-17 RX ORDER — LISINOPRIL 20 MG/1
20 TABLET ORAL DAILY
Qty: 90 TABLET | Refills: 3 | Status: SHIPPED | OUTPATIENT
Start: 2018-04-17 | End: 2019-02-22 | Stop reason: SINTOL

## 2018-04-17 NOTE — PROGRESS NOTES
Subjective:       Patient ID: Didi Angeles is a 62 y.o. female.    Chief Complaint: Follow-up    Established patient for an annual wellness check/physical exam and also chronic disease management. Specific complaints - see dictation and please see ROS.        Review of Systems   Constitutional: Positive for fatigue. Negative for chills and fever.   HENT: Negative for congestion and trouble swallowing.    Eyes: Negative for redness.   Respiratory: Negative for cough, chest tightness and shortness of breath.    Cardiovascular: Negative for chest pain, palpitations and leg swelling.   Gastrointestinal: Negative for abdominal pain and blood in stool.   Genitourinary: Negative for hematuria and menstrual problem.   Musculoskeletal: Positive for arthralgias, back pain, joint swelling and neck pain. Negative for gait problem and myalgias.   Skin: Negative for color change and rash.   Neurological: Positive for numbness. Negative for tremors, speech difficulty, weakness and headaches.   Hematological: Negative for adenopathy. Does not bruise/bleed easily.   Psychiatric/Behavioral: Positive for sleep disturbance. Negative for behavioral problems and confusion. The patient is not nervous/anxious.        Objective:      Physical Exam   Constitutional: She is oriented to person, place, and time. She appears well-developed and well-nourished. No distress.   Eyes: No scleral icterus.   Neck: Normal range of motion. Neck supple. No JVD present. No tracheal deviation present. No thyromegaly present.   Cardiovascular: Normal rate, normal heart sounds and intact distal pulses.  Exam reveals no gallop and no friction rub.    No murmur heard.  Pulmonary/Chest: Effort normal and breath sounds normal. No respiratory distress. She has no wheezes. She has no rales.   Abdominal: Soft. Bowel sounds are normal. She exhibits no distension, no abdominal bruit and no mass. There is no tenderness. There is no rebound and no guarding.    Musculoskeletal: She exhibits no edema.        Thoracic back: She exhibits deformity.        Lumbar back: She exhibits deformity.        Right hand: She exhibits deformity.        Left hand: She exhibits deformity.        Right lower leg: She exhibits no edema.        Left lower leg: She exhibits no edema.   Lymphadenopathy:     She has no cervical adenopathy.   Neurological: She is alert and oriented to person, place, and time. She displays no tremor. No cranial nerve deficit. Coordination and gait normal.   Skin: Skin is warm and dry. No rash noted. She is not diaphoretic. No erythema.   Psychiatric: She has a normal mood and affect. Her behavior is normal. Judgment and thought content normal.   Nursing note and vitals reviewed.      Assessment:       1. Annual physical exam    2. Major depressive disorder, single episode, severe without psychotic features    3. Facet arthropathy, lumbar    4. Hypertension, essential    5. Hyperlipidemia, unspecified hyperlipidemia type    6. Chronic low back pain, unspecified back pain laterality, with sciatica presence unspecified    7. Primary osteoarthritis involving multiple joints    8. Colon cancer screening        Plan:   Didi was seen today for follow-up.    Diagnoses and all orders for this visit:    Annual physical exam  -     Ambulatory referral to Obstetrics / Gynecology    Major depressive disorder, single episode, severe without psychotic features  -     DULoxetine (CYMBALTA) 60 MG capsule; Take 1 capsule (60 mg total) by mouth once daily.  -     gabapentin (NEURONTIN) 300 MG capsule; Take 1 capsule (300 mg total) by mouth 3 (three) times daily. one cap am and two at bedtime    Facet arthropathy, lumbar    Hypertension, essential    Hyperlipidemia, unspecified hyperlipidemia type    Chronic low back pain, unspecified back pain laterality, with sciatica presence unspecified    Primary osteoarthritis involving multiple joints    Colon cancer screening  -     Fecal  Immunochemical Test (iFOBT); Future    Other orders  -     lisinopril (PRINIVIL,ZESTRIL) 20 MG tablet; Take 1 tablet (20 mg total) by mouth once daily.  -     pravastatin (PRAVACHOL) 40 MG tablet; Take 1 tablet (40 mg total) by mouth once daily.      See meds, orders, follow up, routing and instructions sections of encounter.  A 62-year-old established female patient for annual physical examination.  She   lives in [place], [place].  She is off all pain medications at this time,   but she is still having difficulty with chronic pain.  She has significant   arthritic changes in her hands.  She also has spondylitic changes in the spine.    She has no numbness, tingling or weakness at this time.    RECOMMENDATIONS:  Continue Neurontin.  Continue Cymbalta.  Follow up with me in   approximately one year.      BUNNY/JONO  dd: 04/17/2018 17:25:13 (CDT)  td: 04/18/2018 13:02:17 (CDT)  Doc ID   #[id number]  Job ID #[id number]    CC:

## 2018-04-18 ENCOUNTER — OUTPATIENT CASE MANAGEMENT (OUTPATIENT)
Dept: ADMINISTRATIVE | Facility: OTHER | Age: 63
End: 2018-04-18

## 2018-04-18 NOTE — PROGRESS NOTES
4/18/2018    --3rd attempt to complete initial assessment for Outpatient Care Management, left detailed message requesting return call.  Mailed letter on 2nd attempt.  KAZ Roblero

## 2018-04-24 ENCOUNTER — LAB VISIT (OUTPATIENT)
Dept: LAB | Facility: HOSPITAL | Age: 63
End: 2018-04-24
Attending: FAMILY MEDICINE
Payer: COMMERCIAL

## 2018-04-24 DIAGNOSIS — Z12.11 COLON CANCER SCREENING: ICD-10-CM

## 2018-04-24 LAB — HEMOCCULT STL QL IA: NEGATIVE

## 2018-04-24 PROCEDURE — 82274 ASSAY TEST FOR BLOOD FECAL: CPT

## 2018-05-03 ENCOUNTER — PATIENT MESSAGE (OUTPATIENT)
Dept: ADMINISTRATIVE | Facility: OTHER | Age: 63
End: 2018-05-03

## 2018-09-24 DIAGNOSIS — F32.2 MAJOR DEPRESSIVE DISORDER, SINGLE EPISODE, SEVERE WITHOUT PSYCHOTIC FEATURES: ICD-10-CM

## 2018-09-24 RX ORDER — DULOXETIN HYDROCHLORIDE 60 MG/1
CAPSULE, DELAYED RELEASE ORAL
Qty: 90 CAPSULE | Refills: 1 | Status: SHIPPED | OUTPATIENT
Start: 2018-09-24 | End: 2019-02-22 | Stop reason: SDUPTHER

## 2018-10-17 DIAGNOSIS — F32.2 MAJOR DEPRESSIVE DISORDER, SINGLE EPISODE, SEVERE WITHOUT PSYCHOTIC FEATURES: ICD-10-CM

## 2018-10-17 RX ORDER — GABAPENTIN 300 MG/1
CAPSULE ORAL
Qty: 270 CAPSULE | Refills: 1 | Status: SHIPPED | OUTPATIENT
Start: 2018-10-17 | End: 2019-02-22 | Stop reason: SDUPTHER

## 2019-02-21 DIAGNOSIS — Z12.39 SCREENING FOR BREAST CANCER: Primary | ICD-10-CM

## 2019-02-21 DIAGNOSIS — Z11.59 ENCOUNTER FOR HEPATITIS C SCREENING TEST FOR LOW RISK PATIENT: ICD-10-CM

## 2019-02-22 ENCOUNTER — OFFICE VISIT (OUTPATIENT)
Dept: INTERNAL MEDICINE | Facility: CLINIC | Age: 64
End: 2019-02-22
Attending: FAMILY MEDICINE
Payer: MEDICARE

## 2019-02-22 VITALS
WEIGHT: 108.94 LBS | HEART RATE: 75 BPM | SYSTOLIC BLOOD PRESSURE: 136 MMHG | BODY MASS INDEX: 20.05 KG/M2 | OXYGEN SATURATION: 99 % | DIASTOLIC BLOOD PRESSURE: 95 MMHG | TEMPERATURE: 98 F | HEIGHT: 62 IN

## 2019-02-22 DIAGNOSIS — Z12.31 ENCOUNTER FOR SCREENING MAMMOGRAM FOR MALIGNANT NEOPLASM OF BREAST: ICD-10-CM

## 2019-02-22 DIAGNOSIS — F32.2 MAJOR DEPRESSIVE DISORDER, SINGLE EPISODE, SEVERE WITHOUT PSYCHOTIC FEATURES: ICD-10-CM

## 2019-02-22 DIAGNOSIS — Z00.00 ANNUAL PHYSICAL EXAM: Primary | ICD-10-CM

## 2019-02-22 DIAGNOSIS — M46.96 INFLAMMATORY SPONDYLOPATHY OF LUMBAR REGION: ICD-10-CM

## 2019-02-22 DIAGNOSIS — E78.5 HYPERLIPIDEMIA, UNSPECIFIED HYPERLIPIDEMIA TYPE: ICD-10-CM

## 2019-02-22 DIAGNOSIS — I10 HYPERTENSION, ESSENTIAL: ICD-10-CM

## 2019-02-22 PROBLEM — Z53.20 COLONOSCOPY REFUSED: Status: ACTIVE | Noted: 2019-02-22

## 2019-02-22 PROCEDURE — G0009 ADMIN PNEUMOCOCCAL VACCINE: HCPCS | Mod: PBBFAC

## 2019-02-22 PROCEDURE — 99397 PR PREVENTIVE VISIT,EST,65 & OVER: ICD-10-PCS | Mod: S$PBB,,, | Performed by: FAMILY MEDICINE

## 2019-02-22 PROCEDURE — 99397 PER PM REEVAL EST PAT 65+ YR: CPT | Mod: S$PBB,,, | Performed by: FAMILY MEDICINE

## 2019-02-22 PROCEDURE — 99214 OFFICE O/P EST MOD 30 MIN: CPT | Mod: PBBFAC | Performed by: FAMILY MEDICINE

## 2019-02-22 PROCEDURE — 99999 PR PBB SHADOW E&M-EST. PATIENT-LVL IV: ICD-10-PCS | Mod: PBBFAC,,, | Performed by: FAMILY MEDICINE

## 2019-02-22 PROCEDURE — 99999 PR PBB SHADOW E&M-EST. PATIENT-LVL IV: CPT | Mod: PBBFAC,,, | Performed by: FAMILY MEDICINE

## 2019-02-22 RX ORDER — DULOXETIN HYDROCHLORIDE 60 MG/1
60 CAPSULE, DELAYED RELEASE ORAL DAILY
Qty: 90 CAPSULE | Refills: 1 | Status: SHIPPED | OUTPATIENT
Start: 2019-02-22 | End: 2019-10-03 | Stop reason: SDUPTHER

## 2019-02-22 RX ORDER — GABAPENTIN 300 MG/1
CAPSULE ORAL
Qty: 270 CAPSULE | Refills: 1 | Status: SHIPPED | OUTPATIENT
Start: 2019-02-22 | End: 2019-04-17

## 2019-02-22 RX ORDER — VALSARTAN 320 MG/1
320 TABLET ORAL DAILY
Qty: 90 TABLET | Refills: 3 | Status: SHIPPED | OUTPATIENT
Start: 2019-02-22 | End: 2019-08-23 | Stop reason: SDUPTHER

## 2019-02-22 RX ORDER — PRAVASTATIN SODIUM 40 MG/1
40 TABLET ORAL DAILY
Qty: 90 TABLET | Refills: 3 | Status: SHIPPED | OUTPATIENT
Start: 2019-02-22 | End: 2019-08-23 | Stop reason: SDUPTHER

## 2019-02-22 NOTE — PATIENT INSTRUCTIONS
See standing lab orders and please draw Hep C,  CMP and Lipids - today    Flu shot today  Pneumococcal 13 vaccine today    Information about cholesterol, high blood pressure and healthy diet and activity recommendations can be found at the following links on the Internet:    http://www.nhlbi.nih.gov/health/health-topics/topics/hbc  http://www.nhlbi.nih.gov/health/educational/lose_wt/index.htm  Http://www.nhlbi.nih.gov/files/docs/public/heart/hbp_low.pdf  http://www.heart.org/HEARTORG/  http://diabetes.org/  https://www.cdc.gov/  Https://healthfinder.gov/

## 2019-02-22 NOTE — PROGRESS NOTES
Subjective:       Patient ID: Didi Angeles is a 63 y.o. female.    Chief Complaint: Follow-up    Established patient for an annual wellness check/physical exam and also chronic disease management. Specific complaints - see dictation and please see ROS.  P, S, Fm, Soc Hx's; Meds, allergies reviewed and reconciled.  Health maintenance file reviewed and addressed items due.      Review of Systems   Constitutional: Negative for chills, fatigue, fever and unexpected weight change.   HENT: Negative for congestion and trouble swallowing.    Eyes: Negative for redness and visual disturbance.   Respiratory: Negative for cough, chest tightness and shortness of breath.    Cardiovascular: Negative for chest pain, palpitations and leg swelling.   Gastrointestinal: Negative for abdominal pain and blood in stool.   Genitourinary: Negative for difficulty urinating, hematuria and menstrual problem.   Musculoskeletal: Positive for arthralgias and back pain. Negative for gait problem, joint swelling, myalgias and neck pain.   Skin: Negative for color change and rash.   Neurological: Negative for tremors, speech difficulty, weakness, numbness and headaches.   Hematological: Negative for adenopathy. Does not bruise/bleed easily.   Psychiatric/Behavioral: Positive for dysphoric mood. Negative for behavioral problems, confusion and sleep disturbance. The patient is not nervous/anxious.        Objective:      Physical Exam   Constitutional: She is oriented to person, place, and time. She appears well-developed and well-nourished. No distress.   HENT:   Head: Normocephalic.   Right Ear: Tympanic membrane, external ear and ear canal normal.   Left Ear: Tympanic membrane, external ear and ear canal normal.   Nose: Nose normal.   Mouth/Throat: Oropharynx is clear and moist. No oropharyngeal exudate.   Eyes: Conjunctivae are normal. Right eye exhibits no discharge. Left eye exhibits no discharge. No scleral icterus.   Neck: Normal range of  motion. Neck supple. No JVD present. No tracheal deviation present. No thyromegaly present.   Cardiovascular: Normal rate, regular rhythm, normal heart sounds and intact distal pulses. Exam reveals no gallop and no friction rub.   No murmur heard.  Pulmonary/Chest: Effort normal and breath sounds normal. No respiratory distress. She has no wheezes. She has no rales. She exhibits no tenderness.   Abdominal: Soft. She exhibits no distension, no abdominal bruit and no mass. There is no tenderness. There is no rebound and no guarding.   Musculoskeletal: She exhibits no edema.   Lymphadenopathy:     She has no cervical adenopathy.   Neurological: She is alert and oriented to person, place, and time. She displays no tremor. No cranial nerve deficit. Coordination and gait normal.   Skin: Skin is warm and dry. No rash noted. She is not diaphoretic. No erythema.   Psychiatric: She has a normal mood and affect. Her behavior is normal. Judgment and thought content normal.   Nursing note and vitals reviewed.      Assessment:       1. Annual physical exam    2. Inflammatory spondylopathy of lumbar region    3. Major depressive disorder, single episode, severe without psychotic features    4. Hypertension, essential    5. Hyperlipidemia, unspecified hyperlipidemia type    6. Encounter for screening mammogram for malignant neoplasm of breast        Plan:   Didi was seen today for follow-up.    Diagnoses and all orders for this visit:    Annual physical exam  -     Mammo Digital Screening Bilat; Standing  -     Comprehensive metabolic panel; Standing  -     Lipid panel; Standing    Inflammatory spondylopathy of lumbar region    Major depressive disorder, single episode, severe without psychotic features    Hypertension, essential  -     Comprehensive metabolic panel; Standing  -     Hypertension Digital Medicine (HDMP) Enrollment Order  -     [facility] Assign Onboarding  Questionnaires    Hyperlipidemia, unspecified hyperlipidemia type  -     Lipid panel; Standing    Encounter for screening mammogram for malignant neoplasm of breast  -     Mammo Digital Screening Bilat; Standing    Other orders  -     valsartan (DIOVAN) 320 MG tablet; Take 1 tablet (320 mg total) by mouth once daily.  -     pravastatin (PRAVACHOL) 40 MG tablet; Take 1 tablet (40 mg total) by mouth once daily.      See meds, orders, follow up, routing and instructions sections of encounter.  A 63-year-old, annual physical examination.  No acute complaints today with   chronic pain syndrome.  She recently turned 63.  Health maintenance reviewed and   updated orders provided.  Discontinue ACE, switch to ARB, BP recheck in six   weeks.  Diet and exercise discussed.  She is limited by her arthritis.    Suggested water aerobics and entry into a Medicare Risk insurance [employer] membership.      TIMOTEO  dd: 02/22/2019 12:16:18 (CST)  td: 02/23/2019 03:36:58 (CST)  Doc ID   #[id number]  Job ID #[id number]    CC:

## 2019-03-21 ENCOUNTER — DOCUMENTATION ONLY (OUTPATIENT)
Dept: INTERNAL MEDICINE | Facility: CLINIC | Age: 64
End: 2019-03-21

## 2019-04-17 ENCOUNTER — OFFICE VISIT (OUTPATIENT)
Dept: INTERNAL MEDICINE | Facility: CLINIC | Age: 64
End: 2019-04-17
Attending: FAMILY MEDICINE
Payer: MEDICARE

## 2019-04-17 VITALS
WEIGHT: 109.75 LBS | HEART RATE: 100 BPM | SYSTOLIC BLOOD PRESSURE: 136 MMHG | HEIGHT: 62 IN | BODY MASS INDEX: 20.2 KG/M2 | TEMPERATURE: 99 F | DIASTOLIC BLOOD PRESSURE: 82 MMHG | OXYGEN SATURATION: 96 %

## 2019-04-17 DIAGNOSIS — M54.5 CHRONIC LOW BACK PAIN, UNSPECIFIED BACK PAIN LATERALITY, WITH SCIATICA PRESENCE UNSPECIFIED: ICD-10-CM

## 2019-04-17 DIAGNOSIS — M41.9 SCOLIOSIS OF LUMBAR SPINE, UNSPECIFIED SCOLIOSIS TYPE: ICD-10-CM

## 2019-04-17 DIAGNOSIS — G89.29 CHRONIC LOW BACK PAIN, UNSPECIFIED BACK PAIN LATERALITY, WITH SCIATICA PRESENCE UNSPECIFIED: ICD-10-CM

## 2019-04-17 DIAGNOSIS — M15.9 PRIMARY OSTEOARTHRITIS INVOLVING MULTIPLE JOINTS: ICD-10-CM

## 2019-04-17 DIAGNOSIS — F32.2 MAJOR DEPRESSIVE DISORDER, SINGLE EPISODE, SEVERE WITHOUT PSYCHOTIC FEATURES: ICD-10-CM

## 2019-04-17 DIAGNOSIS — I10 HYPERTENSION, ESSENTIAL: Primary | ICD-10-CM

## 2019-04-17 PROCEDURE — 99213 OFFICE O/P EST LOW 20 MIN: CPT | Mod: S$PBB,,, | Performed by: FAMILY MEDICINE

## 2019-04-17 PROCEDURE — 99999 PR PBB SHADOW E&M-EST. PATIENT-LVL III: ICD-10-PCS | Mod: PBBFAC,,, | Performed by: FAMILY MEDICINE

## 2019-04-17 PROCEDURE — 99999 PR PBB SHADOW E&M-EST. PATIENT-LVL III: CPT | Mod: PBBFAC,,, | Performed by: FAMILY MEDICINE

## 2019-04-17 PROCEDURE — 99213 PR OFFICE/OUTPT VISIT, EST, LEVL III, 20-29 MIN: ICD-10-PCS | Mod: S$PBB,,, | Performed by: FAMILY MEDICINE

## 2019-04-17 PROCEDURE — 99213 OFFICE O/P EST LOW 20 MIN: CPT | Mod: PBBFAC | Performed by: FAMILY MEDICINE

## 2019-04-17 RX ORDER — GABAPENTIN 300 MG/1
600 CAPSULE ORAL 3 TIMES DAILY
Qty: 180 CAPSULE | Refills: 5 | Status: SHIPPED | OUTPATIENT
Start: 2019-04-17 | End: 2019-09-06 | Stop reason: SDUPTHER

## 2019-04-17 NOTE — PROGRESS NOTES
Subjective:       Patient ID: Didi Angeles is a 63 y.o. female.    Chief Complaint: Follow-up    HPI  Review of Systems   Constitutional: Negative for chills, fatigue, fever and unexpected weight change.   HENT: Negative for congestion and trouble swallowing.    Eyes: Negative for redness and visual disturbance.   Respiratory: Negative for cough, chest tightness and shortness of breath.    Cardiovascular: Negative for chest pain, palpitations and leg swelling.   Gastrointestinal: Negative for abdominal pain and blood in stool.   Genitourinary: Negative for difficulty urinating, hematuria and menstrual problem.   Musculoskeletal: Positive for arthralgias, back pain and joint swelling. Negative for gait problem, myalgias and neck pain.   Skin: Negative for color change and rash.   Neurological: Negative for tremors, speech difficulty, weakness, numbness and headaches.   Hematological: Negative for adenopathy. Does not bruise/bleed easily.   Psychiatric/Behavioral: Negative for behavioral problems, confusion and sleep disturbance. The patient is not nervous/anxious.        Objective:      Physical Exam   Constitutional: She is oriented to person, place, and time. She appears well-developed and well-nourished. No distress.   Neck: Neck supple.   Pulmonary/Chest: Effort normal.   Musculoskeletal: She exhibits no edema.        Right lower leg: She exhibits no edema.        Left lower leg: She exhibits no edema.   Neurological: She is alert and oriented to person, place, and time.   Skin: Skin is warm and dry. No rash noted.   Psychiatric: She has a normal mood and affect. Her behavior is normal. Judgment and thought content normal.   Nursing note and vitals reviewed.      Assessment:       1. Hypertension, essential    2. Primary osteoarthritis involving multiple joints    3. Scoliosis of lumbar spine, unspecified scoliosis type    4. Chronic low back pain, unspecified back pain laterality, with sciatica presence  unspecified    5. Major depressive disorder, single episode, severe without psychotic features        Plan:   Didi was seen today for follow-up.    Diagnoses and all orders for this visit:    Hypertension, essential    Primary osteoarthritis involving multiple joints    Scoliosis of lumbar spine, unspecified scoliosis type    Chronic low back pain, unspecified back pain laterality, with sciatica presence unspecified  -     Ambulatory referral to Psychiatry    Major depressive disorder, single episode, severe without psychotic features  -     Ambulatory referral to Psychiatry  -     gabapentin (NEURONTIN) 300 MG capsule; Take 2 capsules (600 mg total) by mouth 3 (three) times daily.      See meds, orders, follow up, routing and instructions sections of encounter.  This is a patient for blood pressure followup.  We changed her medications   recently.  She has continued pain in the hip, back, shoulder and neck.  She had   formally been on opioids.  She was taken off.  She has seen Dr. Ibrahim in the   past who left [facility] subsequently.  The patient has had no recent Pain Clinic   or other followups.  She did see Dr. Cheung in March 2016, who felt her arthritic   and pain condition was due to degenerative or osteoarthritis.    She has been on a fairly low-dose of gabapentin and also Cymbalta.  I did   suggest some options at this time.  One would be to increase the gabapentin   dosing to 600 mg three times daily.  Sedation warnings were given.  Follow up in   six months and we did place a referral for her to try to reach out to Dr. Ibrahim.      BUNNY/JONO  dd: 04/17/2019 15:06:26 (CDT)  td: 04/18/2019 00:38:16 (CDT)  Doc ID   #[id number]  Job ID #[id number]    CC:

## 2019-05-30 DIAGNOSIS — Z12.11 COLON CANCER SCREENING: ICD-10-CM

## 2019-08-23 RX ORDER — VALSARTAN 320 MG/1
320 TABLET ORAL DAILY
Qty: 90 TABLET | Refills: 0 | Status: SHIPPED | OUTPATIENT
Start: 2019-08-23 | End: 2019-11-25 | Stop reason: SDUPTHER

## 2019-08-23 RX ORDER — PRAVASTATIN SODIUM 40 MG/1
TABLET ORAL
Qty: 90 TABLET | Refills: 0 | Status: SHIPPED | OUTPATIENT
Start: 2019-08-23 | End: 2019-11-17 | Stop reason: SDUPTHER

## 2019-09-06 DIAGNOSIS — F32.2 MAJOR DEPRESSIVE DISORDER, SINGLE EPISODE, SEVERE WITHOUT PSYCHOTIC FEATURES: ICD-10-CM

## 2019-09-06 RX ORDER — GABAPENTIN 300 MG/1
CAPSULE ORAL
Qty: 270 CAPSULE | Refills: 0 | Status: SHIPPED | OUTPATIENT
Start: 2019-09-06 | End: 2019-12-12 | Stop reason: ALTCHOICE

## 2019-10-03 DIAGNOSIS — F32.2 MAJOR DEPRESSIVE DISORDER, SINGLE EPISODE, SEVERE WITHOUT PSYCHOTIC FEATURES: ICD-10-CM

## 2019-10-03 RX ORDER — DULOXETIN HYDROCHLORIDE 60 MG/1
CAPSULE, DELAYED RELEASE ORAL
Qty: 90 CAPSULE | Refills: 1 | Status: SHIPPED | OUTPATIENT
Start: 2019-10-03 | End: 2019-12-12 | Stop reason: ALTCHOICE

## 2019-11-17 RX ORDER — PRAVASTATIN SODIUM 40 MG/1
TABLET ORAL
Qty: 90 TABLET | Refills: 0 | Status: SHIPPED | OUTPATIENT
Start: 2019-11-17 | End: 2020-02-21

## 2019-11-25 DIAGNOSIS — I10 HYPERTENSION, ESSENTIAL: Primary | ICD-10-CM

## 2019-11-25 DIAGNOSIS — Z00.00 ANNUAL PHYSICAL EXAM: ICD-10-CM

## 2019-11-25 NOTE — TELEPHONE ENCOUNTER
----- Message from Frandy Judd, Patient Care Assistant sent at 11/25/2019  2:22 PM CST -----  Contact: Self  Pt is calling because she needs a refill on   valsartan (DIOVAN) 320 MG tablet    Pt uses [facility]/pharmacy #[id number] - [place], [place] - [address] [facility] [phone]      Pt meds will run out before her appt on Dec 12    Please advise, she can be reached at [phone].

## 2019-11-26 RX ORDER — VALSARTAN 320 MG/1
320 TABLET ORAL DAILY
Qty: 90 TABLET | Refills: 0 | Status: SHIPPED | OUTPATIENT
Start: 2019-11-26 | End: 2019-11-26

## 2019-11-26 RX ORDER — VALSARTAN 320 MG/1
320 TABLET ORAL DAILY
Qty: 90 TABLET | Refills: 0 | Status: SHIPPED | OUTPATIENT
Start: 2019-11-26 | End: 2020-05-18

## 2019-11-26 NOTE — TELEPHONE ENCOUNTER
Please schedule patient for labs (CMP/LIPIDS).         Please also check with your provider if any further labs need to be added and scheduled together.        Thank you

## 2019-11-26 NOTE — TELEPHONE ENCOUNTER
See standing or future lab orders and please draw CMP and Lipids - prior to upcoming visit, thank you.

## 2019-11-26 NOTE — TELEPHONE ENCOUNTER
spoke to pt informing that requested medication refilled and sent by electronic prescription to the pharmacy. Advised pt to call pharmacy prior . Pt verbalized understanding    Also scheduled labs same day with the f/up visit per pt's request

## 2019-11-26 NOTE — PROGRESS NOTES
Refill Authorization Note     is requesting a refill authorization.    Brief assessment and rationale for refill: APPROVE: Needs Labs  Name and strength of medication: valsartan (DIOVAN) 320 MG tablet  Medication-related problems identified: Requires labs    Medication Therapy Plan: NTBSO(CMP/LIPID); FOVS; Approve 3 more months    Medication reconciliation completed: No              How patient will take medication: t1t po qd           Comments:   Blood Pressure Readings: <139/89mmHg 12 months   BP Readings from Last 3 Encounters:   04/17/19 136/82   02/22/19 (!) 136/95   04/17/18 112/60         Last Labs: 6 months: Diuretics-(Cr/K/Na)  or 12 months: non-diuretics (Cr/K)   Lab Results   Component Value Date    CREATININE 0.9 04/17/2018    CREATININE 0.8 03/13/2017    CREATININE 0.7 11/14/2016       Lab Results   Component Value Date    K 4.3 04/17/2018    K 4.4 03/13/2017    K 4.4 11/14/2016    Lab Results   Component Value Date     04/17/2018     03/13/2017     11/14/2016        Digital Hypertension Data: values will display if enrolled   Last 5 Patient Entered Readings                                      Current 30 Day Average:      There is no flowsheet data to display.           Appointments     Date Provider   Last Visit   4/17/2019 Carrillo Flynn MD   Next Visit   12/12/2019 Carrillo Flynn MD

## 2019-12-12 ENCOUNTER — OFFICE VISIT (OUTPATIENT)
Dept: INTERNAL MEDICINE | Facility: CLINIC | Age: 64
End: 2019-12-12
Attending: FAMILY MEDICINE
Payer: MEDICARE

## 2019-12-12 ENCOUNTER — DOCUMENTATION ONLY (OUTPATIENT)
Dept: INTERNAL MEDICINE | Facility: CLINIC | Age: 64
End: 2019-12-12

## 2019-12-12 ENCOUNTER — IMMUNIZATION (OUTPATIENT)
Dept: PHARMACY | Facility: CLINIC | Age: 64
End: 2019-12-12
Payer: MEDICARE

## 2019-12-12 ENCOUNTER — HOSPITAL ENCOUNTER (OUTPATIENT)
Dept: RADIOLOGY | Facility: HOSPITAL | Age: 64
Discharge: HOME | End: 2019-12-12
Attending: FAMILY MEDICINE
Payer: MEDICARE

## 2019-12-12 ENCOUNTER — IMMUNIZATION (OUTPATIENT)
Dept: INTERNAL MEDICINE | Facility: CLINIC | Age: 64
End: 2019-12-12
Payer: MEDICARE

## 2019-12-12 VITALS
BODY MASS INDEX: 20.51 KG/M2 | SYSTOLIC BLOOD PRESSURE: 118 MMHG | DIASTOLIC BLOOD PRESSURE: 68 MMHG | HEART RATE: 96 BPM | OXYGEN SATURATION: 97 % | HEIGHT: 62 IN | WEIGHT: 111.44 LBS | TEMPERATURE: 99 F

## 2019-12-12 VITALS — WEIGHT: 109.81 LBS | BODY MASS INDEX: 20.08 KG/M2

## 2019-12-12 DIAGNOSIS — I10 HYPERTENSION, ESSENTIAL: Primary | ICD-10-CM

## 2019-12-12 DIAGNOSIS — N95.9 MENOPAUSAL AND PERIMENOPAUSAL DISORDER: ICD-10-CM

## 2019-12-12 DIAGNOSIS — Z12.39 SCREENING FOR BREAST CANCER: ICD-10-CM

## 2019-12-12 DIAGNOSIS — E78.5 HYPERLIPIDEMIA, UNSPECIFIED HYPERLIPIDEMIA TYPE: ICD-10-CM

## 2019-12-12 DIAGNOSIS — Z12.11 COLON CANCER SCREENING: ICD-10-CM

## 2019-12-12 DIAGNOSIS — F32.2 MAJOR DEPRESSIVE DISORDER, SINGLE EPISODE, SEVERE WITHOUT PSYCHOTIC FEATURES: ICD-10-CM

## 2019-12-12 PROCEDURE — 99214 OFFICE O/P EST MOD 30 MIN: CPT | Mod: S$PBB,,, | Performed by: FAMILY MEDICINE

## 2019-12-12 PROCEDURE — 1125F AMNT PAIN NOTED PAIN PRSNT: CPT | Mod: ,,, | Performed by: FAMILY MEDICINE

## 2019-12-12 PROCEDURE — 77067 SCR MAMMO BI INCL CAD: CPT | Mod: TC

## 2019-12-12 PROCEDURE — 1159F MED LIST DOCD IN RCRD: CPT | Mod: ,,, | Performed by: FAMILY MEDICINE

## 2019-12-12 PROCEDURE — 90662 IIV NO PRSV INCREASED AG IM: CPT | Mod: PBBFAC

## 2019-12-12 PROCEDURE — 1159F PR MEDICATION LIST DOCUMENTED IN MEDICAL RECORD: ICD-10-PCS | Mod: ,,, | Performed by: FAMILY MEDICINE

## 2019-12-12 PROCEDURE — 77067 SCR MAMMO BI INCL CAD: CPT | Mod: 26,,, | Performed by: RADIOLOGY

## 2019-12-12 PROCEDURE — 99214 PR OFFICE/OUTPT VISIT, EST, LEVL IV, 30-39 MIN: ICD-10-PCS | Mod: S$PBB,,, | Performed by: FAMILY MEDICINE

## 2019-12-12 PROCEDURE — 77067 MAMMO DIGITAL SCREENING BILAT WITH TOMOSYNTHESIS_CAD: ICD-10-PCS | Mod: 26,,, | Performed by: RADIOLOGY

## 2019-12-12 PROCEDURE — 99999 PR PBB SHADOW E&M-EST. PATIENT-LVL IV: CPT | Mod: PBBFAC,,, | Performed by: FAMILY MEDICINE

## 2019-12-12 PROCEDURE — 99214 OFFICE O/P EST MOD 30 MIN: CPT | Mod: PBBFAC,25 | Performed by: FAMILY MEDICINE

## 2019-12-12 PROCEDURE — 77063 BREAST TOMOSYNTHESIS BI: CPT | Mod: 26,,, | Performed by: RADIOLOGY

## 2019-12-12 PROCEDURE — 99999 PR PBB SHADOW E&M-EST. PATIENT-LVL IV: ICD-10-PCS | Mod: PBBFAC,,, | Performed by: FAMILY MEDICINE

## 2019-12-12 PROCEDURE — 1125F PR PAIN SEVERITY QUANTIFIED, PAIN PRESENT: ICD-10-PCS | Mod: ,,, | Performed by: FAMILY MEDICINE

## 2019-12-12 PROCEDURE — 77063 MAMMO DIGITAL SCREENING BILAT WITH TOMOSYNTHESIS_CAD: ICD-10-PCS | Mod: 26,,, | Performed by: RADIOLOGY

## 2019-12-12 RX ORDER — DULOXETIN HYDROCHLORIDE 30 MG/1
30 CAPSULE, DELAYED RELEASE ORAL DAILY
Qty: 30 CAPSULE | Refills: 0 | Status: SHIPPED | OUTPATIENT
Start: 2019-12-12 | End: 2020-01-21 | Stop reason: ALTCHOICE

## 2019-12-12 RX ORDER — FLUOXETINE 10 MG/1
10 CAPSULE ORAL DAILY
Qty: 60 CAPSULE | Refills: 2 | Status: SHIPPED | OUTPATIENT
Start: 2019-12-12 | End: 2020-05-28

## 2019-12-12 NOTE — PROGRESS NOTES
Subjective:       Patient ID: Didi Angeles is a 64 y.o. female.    Chief Complaint: Follow-up    Established patient follows up for management of chronic medical illnesses with complaints today. Please see dictation and ROS for interval problems, specific complaints and disease management discussion.    P, S, Fm, Soc Hx's; Meds, allergies reviewed and reconciled.  Health maintenance file reviewed and addressed items due.    Pain in hands, right shoulder, back.  Shoulder pain is getting somewhat better.  Difficulty with overhead reach or reaching behind back.  No numbness tingling or weakness distally.    No noted cardiopulmonary complaints or difficulty with her blood pressure or lipid medication.    Review of Systems   Constitutional: Negative for activity change and unexpected weight change.   HENT: Negative for hearing loss, rhinorrhea and trouble swallowing.    Eyes: Negative for discharge and visual disturbance.   Respiratory: Negative for chest tightness and wheezing.    Cardiovascular: Negative for chest pain and palpitations.   Gastrointestinal: Negative for blood in stool, constipation, diarrhea and vomiting.   Endocrine: Negative for polydipsia and polyuria.   Genitourinary: Negative for difficulty urinating, dysuria, hematuria and menstrual problem.   Musculoskeletal: Positive for arthralgias, back pain, joint swelling and neck pain.   Neurological: Negative for weakness and headaches.   Psychiatric/Behavioral: Positive for dysphoric mood. Negative for confusion.       Objective:      Physical Exam   Constitutional: She is oriented to person, place, and time. She appears well-developed and well-nourished. No distress.   HENT:   Head: Normocephalic and atraumatic.   Eyes: Conjunctivae are normal. No scleral icterus.   Neck: Normal range of motion. Neck supple.   Cardiovascular: Normal rate, normal heart sounds and intact distal pulses. Exam reveals no gallop and no friction rub.   No murmur  heard.  Pulmonary/Chest: Effort normal and breath sounds normal. No respiratory distress. She has no wheezes. She has no rales.   Abdominal: She exhibits no distension, no abdominal bruit and no mass. There is no tenderness. There is no rebound and no guarding.   Musculoskeletal: She exhibits no edema.        Right shoulder: She exhibits tenderness. She exhibits normal range of motion and normal strength.        Left shoulder: She exhibits normal range of motion, no tenderness and normal strength.        Right hand: She exhibits deformity.        Left hand: She exhibits deformity.        Right lower leg: She exhibits no edema.        Left lower leg: She exhibits no edema.   Neurological: She is alert and oriented to person, place, and time. She displays no tremor. No cranial nerve deficit. Coordination and gait normal.   Skin: Skin is warm and dry. No rash noted. She is not diaphoretic. No erythema.   Psychiatric: She has a normal mood and affect. Her behavior is normal. Judgment and thought content normal.   Nursing note and vitals reviewed.      Assessment:       1. Hypertension, essential    2. Hyperlipidemia, unspecified hyperlipidemia type    3. Major depressive disorder, single episode, severe without psychotic features    4. Menopausal and perimenopausal disorder    5. Colon cancer screening        Plan:     Medication List with Changes/Refills   New Medications    DULOXETINE (CYMBALTA) 30 MG CAPSULE    Take 1 capsule (30 mg total) by mouth once daily.    FLUOXETINE 10 MG CAPSULE    Take 1 capsule (10 mg total) by mouth once daily.   Current Medications    CALCIUM CARBONATE/VITAMIN D3 (CALCIUM 600 + D,3, ORAL)    Take by mouth once daily.    KRILL OIL ORAL    Take by mouth once daily.    PRAVASTATIN (PRAVACHOL) 40 MG TABLET    TAKE 1 TABLET BY MOUTH EVERY DAY    VALSARTAN (DIOVAN) 320 MG TABLET    Take 1 tablet (320 mg total) by mouth once daily.   Discontinued Medications    DULOXETINE (CYMBALTA) 60 MG  CAPSULE    TAKE 1 CAPSULE BY MOUTH EVERY DAY    ERYTHROMYCIN (ROMYCIN) OPHTHALMIC OINTMENT    Place a 1/2 inch ribbon of ointment into the lower eyelid four times a day for 5 days.    GABAPENTIN (NEURONTIN) 300 MG CAPSULE    TAKE ONE CAPSULE BY MOUTH IN THE MORNING AND TAKE 2 CAPSULES BY MOUTH AT BEDTIME     Didi was seen today for follow-up.    Diagnoses and all orders for this visit:    Hypertension, essential    Hyperlipidemia, unspecified hyperlipidemia type    Major depressive disorder, single episode, severe without psychotic features    Menopausal and perimenopausal disorder  -     DXA Bone Density Spine And Hip; Future    Colon cancer screening  -     Fecal Immunochemical Test (iFOBT); Future    Other orders  -     DULoxetine (CYMBALTA) 30 MG capsule; Take 1 capsule (30 mg total) by mouth once daily.  -     FLUoxetine 10 MG capsule; Take 1 capsule (10 mg total) by mouth once daily.      See meds, orders, follow up, routing and instructions sections of encounter and AVS. Discussed with patient and provided on AVS.    She weaned herself off of gabapentin.  She does not notice a difference either positive or negative.  But no fatigue or side effects.  Notes that she has chronic arthritic pain throughout.  She lives in [facility] and is currently taking care of her 88-year-old mother, who lives next door.  Mobility problems. Her Cymbalta is somewhat effective though she seems generally dissatisfied with the long-term.  Stated she had been on Prozac in the past with a bit better response, possibly.  I discussed a cross taper for this.  Reviewed her recent laboratory.  Lipid panel is pending.  Declines colonoscopy but is willing to do a stool card.  Follow-up in 6 weeks to discuss medication taper and change.  Potential side effects mentioned.

## 2019-12-12 NOTE — PATIENT INSTRUCTIONS
Flu shot today    TDAP vaccine today    Reduce cymbalta to the 30 mg once daily for 2-3 weeks. You may start the fluoxetine (prozac) 10 mg once daily for this 2-3 weeks and then increase to 2 tabs daily when you stop the cymbalta.

## 2020-01-12 ENCOUNTER — LAB VISIT (OUTPATIENT)
Dept: LAB | Facility: HOSPITAL | Age: 65
End: 2020-01-12
Attending: FAMILY MEDICINE
Payer: MEDICARE

## 2020-01-12 DIAGNOSIS — Z12.11 COLON CANCER SCREENING: ICD-10-CM

## 2020-01-12 PROCEDURE — 82274 ASSAY TEST FOR BLOOD FECAL: CPT

## 2020-01-21 ENCOUNTER — HOSPITAL ENCOUNTER (OUTPATIENT)
Dept: RADIOLOGY | Facility: CLINIC | Age: 65
Discharge: HOME | End: 2020-01-21
Attending: FAMILY MEDICINE
Payer: MEDICARE

## 2020-01-21 ENCOUNTER — OFFICE VISIT (OUTPATIENT)
Dept: INTERNAL MEDICINE | Facility: CLINIC | Age: 65
End: 2020-01-21
Attending: FAMILY MEDICINE
Payer: MEDICARE

## 2020-01-21 VITALS
HEIGHT: 62 IN | WEIGHT: 109.81 LBS | BODY MASS INDEX: 20.21 KG/M2 | HEART RATE: 87 BPM | TEMPERATURE: 98 F | SYSTOLIC BLOOD PRESSURE: 152 MMHG | OXYGEN SATURATION: 97 % | DIASTOLIC BLOOD PRESSURE: 86 MMHG

## 2020-01-21 DIAGNOSIS — M54.50 CHRONIC LOW BACK PAIN, UNSPECIFIED BACK PAIN LATERALITY, UNSPECIFIED WHETHER SCIATICA PRESENT: ICD-10-CM

## 2020-01-21 DIAGNOSIS — F32.2 MAJOR DEPRESSIVE DISORDER, SINGLE EPISODE, SEVERE WITHOUT PSYCHOTIC FEATURES: Primary | ICD-10-CM

## 2020-01-21 DIAGNOSIS — M15.9 PRIMARY OSTEOARTHRITIS INVOLVING MULTIPLE JOINTS: ICD-10-CM

## 2020-01-21 DIAGNOSIS — G89.29 CHRONIC LOW BACK PAIN, UNSPECIFIED BACK PAIN LATERALITY, UNSPECIFIED WHETHER SCIATICA PRESENT: ICD-10-CM

## 2020-01-21 DIAGNOSIS — I10 HYPERTENSION, ESSENTIAL: ICD-10-CM

## 2020-01-21 DIAGNOSIS — N95.9 MENOPAUSAL AND PERIMENOPAUSAL DISORDER: ICD-10-CM

## 2020-01-21 LAB — HEMOCCULT STL QL IA: NEGATIVE

## 2020-01-21 PROCEDURE — 1159F PR MEDICATION LIST DOCUMENTED IN MEDICAL RECORD: ICD-10-PCS | Mod: ,,, | Performed by: FAMILY MEDICINE

## 2020-01-21 PROCEDURE — 1159F MED LIST DOCD IN RCRD: CPT | Mod: ,,, | Performed by: FAMILY MEDICINE

## 2020-01-21 PROCEDURE — 1125F PR PAIN SEVERITY QUANTIFIED, PAIN PRESENT: ICD-10-PCS | Mod: ,,, | Performed by: FAMILY MEDICINE

## 2020-01-21 PROCEDURE — 99213 PR OFFICE/OUTPT VISIT, EST, LEVL III, 20-29 MIN: ICD-10-PCS | Mod: S$PBB,,, | Performed by: FAMILY MEDICINE

## 2020-01-21 PROCEDURE — 77080 DXA BONE DENSITY AXIAL: CPT | Mod: TC

## 2020-01-21 PROCEDURE — 99213 OFFICE O/P EST LOW 20 MIN: CPT | Mod: S$PBB,,, | Performed by: FAMILY MEDICINE

## 2020-01-21 PROCEDURE — 99213 OFFICE O/P EST LOW 20 MIN: CPT | Mod: PBBFAC,25 | Performed by: FAMILY MEDICINE

## 2020-01-21 PROCEDURE — 99999 PR PBB SHADOW E&M-EST. PATIENT-LVL III: CPT | Mod: PBBFAC,,, | Performed by: FAMILY MEDICINE

## 2020-01-21 PROCEDURE — 77080 DXA BONE DENSITY AXIAL: CPT | Mod: 26,,, | Performed by: INTERNAL MEDICINE

## 2020-01-21 PROCEDURE — 1125F AMNT PAIN NOTED PAIN PRSNT: CPT | Mod: ,,, | Performed by: FAMILY MEDICINE

## 2020-01-21 PROCEDURE — 99999 PR PBB SHADOW E&M-EST. PATIENT-LVL III: ICD-10-PCS | Mod: PBBFAC,,, | Performed by: FAMILY MEDICINE

## 2020-01-21 PROCEDURE — 77080 DEXA BONE DENSITY SPINE HIP: ICD-10-PCS | Mod: 26,,, | Performed by: INTERNAL MEDICINE

## 2020-01-21 NOTE — PROGRESS NOTES
Subjective:       Patient ID: Didi Angeles is a 64 y.o. female.    Chief Complaint: Follow-up    Established patient follows up for management of chronic medical illnesses with complaints today. Please see dictation and ROS for interval problems, specific complaints and disease management discussion.    P, S, Fm, Soc Hx's; Meds, allergies reviewed and reconciled.  Health maintenance file reviewed and addressed items due.    Her Prozac seems to be working fairly well.  Better than Cymbalta.  It is creating a bit of anxiety and to clenching.  She has some family discord at home in [facility] where she lives next door to her 88-year-old mother.  A male sibling lives with her mother.  There are some arguments concerning her care.  Patient has a daughter here in the local area.    Review of Systems   Constitutional: Negative for chills, fatigue, fever and unexpected weight change.   HENT: Negative for congestion and trouble swallowing.    Eyes: Negative for redness and visual disturbance.   Respiratory: Negative for cough, chest tightness and shortness of breath.    Cardiovascular: Negative for chest pain, palpitations and leg swelling.   Gastrointestinal: Negative for abdominal pain and blood in stool.   Genitourinary: Negative for difficulty urinating, hematuria and menstrual problem.   Musculoskeletal: Positive for arthralgias, back pain, joint swelling and myalgias. Negative for gait problem and neck pain.   Skin: Negative for color change and rash.   Neurological: Negative for tremors, speech difficulty, weakness, numbness and headaches.   Hematological: Negative for adenopathy. Does not bruise/bleed easily.   Psychiatric/Behavioral: Positive for dysphoric mood. Negative for behavioral problems, confusion and sleep disturbance. The patient is nervous/anxious.        Objective:      Physical Exam   Constitutional: She is oriented to person, place, and time. She appears well-developed and  well-nourished. No distress.   Neck: Neck supple.   Pulmonary/Chest: Effort normal.   Musculoskeletal: She exhibits no edema.        Right lower leg: She exhibits no edema.        Left lower leg: She exhibits no edema.   Neurological: She is alert and oriented to person, place, and time.   Skin: Skin is warm and dry. No rash noted.   Psychiatric: She has a normal mood and affect. Her behavior is normal. Judgment and thought content normal.   Nursing note and vitals reviewed.      Assessment:       1. Major depressive disorder, single episode, severe without psychotic features    2. Hypertension, essential    3. Primary osteoarthritis involving multiple joints    4. Chronic low back pain, unspecified back pain laterality, unspecified whether sciatica present        Plan:     Medication List with Changes/Refills   Current Medications    CALCIUM CARBONATE/VITAMIN D3 (CALCIUM 600 + D,3, ORAL)    Take by mouth once daily.    FLUOXETINE 10 MG CAPSULE    Take 1 capsule (10 mg total) by mouth once daily.    KRILL OIL ORAL    Take by mouth once daily.    PRAVASTATIN (PRAVACHOL) 40 MG TABLET    TAKE 1 TABLET BY MOUTH EVERY DAY    VALSARTAN (DIOVAN) 320 MG TABLET    Take 1 tablet (320 mg total) by mouth once daily.   Discontinued Medications    DIPHTH,PERTUS,ACELL,,TETANUS (BOOSTRIX TDAP) 2.5-8-5 LF-MCG-LF/0.5ML SUSP    Inject 0.5 mLs into the muscle.    DULOXETINE (CYMBALTA) 30 MG CAPSULE    Take 1 capsule (30 mg total) by mouth once daily.     Didi was seen today for follow-up.    Diagnoses and all orders for this visit:    Major depressive disorder, single episode, severe without psychotic features    Hypertension, essential    Primary osteoarthritis involving multiple joints    Chronic low back pain, unspecified back pain laterality, unspecified whether sciatica present      See meds, orders, follow up, routing and instructions sections of encounter and AVS. Discussed with patient and provided on AVS.    Discussed her  medications and will stay on Prozac for the time being.  Future considerations would be Lexapro.  She has siblings on Lexapro.  They tend not to complain about it.  She will notify me by message Ng in the next few weeks about Tohmas as she has a longer trial on this medication.  Her blood pressure was a little bit elevated today and we will follow up in a few months.  She will monitor at home.  All in all she was feeling better today, less depression.  Her pain complaints are not improved.  She has seen Rheumatology in the past.  No follow-up recommendations.

## 2020-02-21 RX ORDER — PRAVASTATIN SODIUM 40 MG/1
TABLET ORAL
Qty: 90 TABLET | Refills: 3 | Status: SHIPPED | OUTPATIENT
Start: 2020-02-21 | End: 2021-02-10

## 2020-02-21 NOTE — PROGRESS NOTES
Refill Authorization Note     is requesting a refill authorization.    Brief assessment and rationale for refill: APPROVE: prr                                         Comments:   Requested Prescriptions   Pending Prescriptions Disp Refills    pravastatin (PRAVACHOL) 40 MG tablet [Pharmacy Med Name: PRAVASTATIN SODIUM 40 MG TAB] 90 tablet 3     Sig: TAKE 1 TABLET BY MOUTH EVERY DAY       Hmg CoA Reductase Inhibitors Protocol Passed - 2/18/2020  1:27 AM        Passed - Patient not currently pregnant        Passed - No positive pregnancy test in past 12 months         Passed - Recent or future visit with authorizing provider        Passed - Lipid Panel within past 12 months     Lab Results   Component Value Date    CHOL 160 12/12/2019    HDL 77 (H) 12/12/2019    LDLCALC 65.8 12/12/2019    TRIG 86 12/12/2019             Passed - ALT less than 95 in past 12 months      Lab Results   Component Value Date    ALT 26 12/12/2019    ALT 13 04/17/2018    ALT 15 04/18/2016

## 2020-05-16 DIAGNOSIS — I10 HYPERTENSION, ESSENTIAL: ICD-10-CM

## 2020-05-18 RX ORDER — VALSARTAN 320 MG/1
TABLET ORAL
Qty: 90 TABLET | Refills: 1 | Status: SHIPPED | OUTPATIENT
Start: 2020-05-18 | End: 2020-11-13

## 2020-05-19 NOTE — PROGRESS NOTES
Refill Authorization Note    is requesting a refill authorization.    Brief assessment and rationale for refill: APPROVE: prr               Medication reconciliation completed: No                         Comments:      Requested Prescriptions   Pending Prescriptions Disp Refills    valsartan (DIOVAN) 320 MG tablet [Pharmacy Med Name: VALSARTAN 320 MG TABLET] 90 tablet 1     Sig: TAKE 1 TABLET DAILY       ARB Protocol Passed - 5/18/2020  7:47 PM        Passed - Patient is not curently pregnant        Passed - No positive pregnancy test in past 12 months         Passed - Serum Creatinine less than 1.4 on file in the past 12 months     Lab Results   Component Value Date    CREATININE 0.7 12/12/2019    CREATININE 0.9 04/17/2018    CREATININE 0.8 03/13/2017     Lab Results   Component Value Date    EGFRNONAA >60 12/12/2019    EGFRNONAA >60.0 04/17/2018    EGFRNONAA >60.0 03/13/2017               Passed - Visit with authorizing provider in past 12 months or upcoming 90 days         Passed - Serum Potassium less than 5.2 on file in the past 12 months     Lab Results   Component Value Date    K 3.7 12/12/2019    K 4.3 04/17/2018    K 4.4 03/13/2017                  Passed - Blood Pressure below 139/89 on file in past 12 months      BP Readings from Last 3 Encounters:   01/21/20 (!) 152/86   12/12/19 118/68   04/17/19 136/82              Appointments  past 12m or future 3m with PCP    Date Provider   Last Visit   1/21/2020 Carrillo Flynn MD   Next Visit   Visit date not found Carrillo Flynn MD   ED visits in past 90 days: 0     Note composed:7:49 PM 05/18/2020

## 2020-05-28 RX ORDER — FLUOXETINE 10 MG/1
CAPSULE ORAL
Qty: 90 CAPSULE | Refills: 0 | Status: SHIPPED | OUTPATIENT
Start: 2020-05-28 | End: 2020-08-25

## 2020-05-28 NOTE — PROGRESS NOTES
Refill Authorization Note    is requesting a refill authorization.    Brief assessment and rationale for refill: APPROVE: prr               Medication reconciliation completed: No                         Comments:      Requested Prescriptions   Signed Prescriptions Disp Refills    FLUoxetine 10 MG capsule 90 capsule 0     Sig: TAKE 1 CAPSULE BY MOUTH EVERY DAY       Psychiatry:  Antidepressants - SSRI Passed - 5/27/2020 12:49 AM        Passed - Patient is at least 18 years old        Passed - Office visit in past 6 months or future 90 days.     Recent Outpatient Visits            4 months ago Major depressive disorder, single episode, severe without psychotic features    [facility] Carrillo Flynn MD    5 months ago Hypertension, essential    [facility] Carrillo Flynn MD    1 year ago Hypertension, essential    [facility] Carrillo Flynn MD    1 year ago Annual physical exam    [facility] Dago Flynn MD    2 years ago Annual physical exam    [facility] Dago Flynn MD                     Appointments  past 12m or future 3m with PCP    Date Provider   Last Visit   1/21/2020 Carrillo Flynn MD   Next Visit   Visit date not found Carrillo Flynn MD   ED visits in past 90 days: 0     Note composed:2:33 AM 05/28/2020

## 2020-07-17 DIAGNOSIS — Z71.89 COMPLEX CARE COORDINATION: ICD-10-CM

## 2020-08-24 NOTE — TELEPHONE ENCOUNTER
No new care gaps identified.  Powered by [employer]. Reference number: [id number]. 8/24/2020 1:01:39 AM [email or web address]

## 2020-08-25 RX ORDER — FLUOXETINE 10 MG/1
CAPSULE ORAL
Qty: 90 CAPSULE | Refills: 1 | Status: SHIPPED | OUTPATIENT
Start: 2020-08-25 | End: 2021-02-23

## 2020-08-25 NOTE — PROGRESS NOTES
Refill Authorization Note    is requesting a refill authorization.    Brief assessment and rationale for refill: APPROVE: prr          Medication Therapy Plan: [facility]    Medication reconciliation completed: No                       Comments:      Orders Placed This Encounter    FLUoxetine 10 MG capsule      Requested Prescriptions   Signed Prescriptions Disp Refills    FLUoxetine 10 MG capsule 90 capsule 1     Sig: TAKE 1 CAPSULE BY MOUTH EVERY DAY       Psychiatry:  Antidepressants - SSRI Failed - 8/24/2020  1:01 AM        Failed - Office visit in past 6 months or future 90 days.     Recent Outpatient Visits            7 months ago Major depressive disorder, single episode, severe without psychotic features    [facility] Carrillo Flynn MD    8 months ago Hypertension, essential    [facility] Carrillo Flynn MD    1 [facility], [facility]    1 year ago Annual physical exam    [facility] Carrillo Flynn MD    2 years ago Annual physical exam    [facility] Carrillo Flynn MD                    Passed - Patient is at least 18 years old            Appointments  past 12m or future 3m with PCP    Date Provider   Last Visit   1/21/2020 Carrillo Flynn MD   Next Visit   Visit date not found Carrillo Flynn MD   ED visits in past 90 days: 0     Note composed:8:38 AM 08/25/2020

## 2020-10-01 ENCOUNTER — PATIENT MESSAGE (OUTPATIENT)
Dept: OTHER | Facility: OTHER | Age: 65
End: 2020-10-01

## 2020-11-13 DIAGNOSIS — I10 HYPERTENSION, ESSENTIAL: ICD-10-CM

## 2020-11-13 RX ORDER — VALSARTAN 320 MG/1
TABLET ORAL
Qty: 90 TABLET | Refills: 0 | Status: SHIPPED | OUTPATIENT
Start: 2020-11-13 | End: 2021-02-10

## 2020-11-13 NOTE — PROGRESS NOTES
Refill Routing Note   Medication(s) are not appropriate for processing by [facility] for the following reason(s):     - Required vitals are abnormal    OR actions taken in this encounter: Defer    Will follow up with your staff to schedule appointment and labs after your decision.    Medication-related problems identified:   Requires labs  Requires appointment  Medication Therapy Plan: CDMR. NEEDS APPT(ANNUAL). LABS(CMP, LIPIDS) PER WOG; BP-ELEVATED(152/86) NOT TO ORC STANDARDS ABNORMAL VITALS, PLEASE ADVISE  Medication reconciliation completed: No   Automatic Epic Generated Protocol Data:        Requested Prescriptions   Pending Prescriptions Disp Refills    valsartan (DIOVAN) 320 MG tablet [Pharmacy Med Name: VALSARTAN 320 MG TABLET] 90 tablet 0     Sig: TAKE 1 TABLET BY MOUTH EVERY DAY       Cardiovascular:  Angiotensin Receptor Blockers Failed - 11/13/2020  1:27 AM        Failed - Last BP in normal range within 360 days.     BP Readings from Last 3 Encounters:   01/21/20 (!) 152/86   12/12/19 118/68   04/17/19 136/82              Passed - Patient is at least 18 years old        Passed - Office visit in past 12 months or future 90 days     Recent Outpatient Visits            9 months ago Major depressive disorder, single episode, severe without psychotic features    [facility] paola Flynn MD    11 months ago Hypertension, essential    [facility] paola Flynn MD    1 year ago Hypertension, essential    [facility] Willis Flynn MD    1 year ago [facility] physical exam    [facility]    [facility]                    Passed - Cr is 1.4 or below and within 360 days     Creatinine   Date Value Ref Range Status   12/12/2019 0.7 0.5 - 1.4 mg/dL Final   04/17/2018 0.9 0.5 - 1.4 mg/dL Final    03/13/2017 0.8 0.5 - 1.4 mg/dL Final              Passed - K in normal range and within 360 days     Potassium   Date Value Ref Range Status   12/12/2019 3.7 3.5 - 5.1 mmol/L Final   04/17/2018 4.3 3.5 - 5.1 mmol/L Final   03/13/2017 4.4 3.5 - 5.1 mmol/L Final              Passed - eGFR within 360 days     eGFR if non    Date Value Ref Range Status   12/12/2019 >60 >60 mL/min/1.73 m^2 Final     Comment:     Calculation used to obtain the estimated glomerular filtration  rate (eGFR) is the CKD-EPI equation.      04/17/2018 >60.0 >60 mL/min/1.73 m^2 Final     Comment:     Calculation used to obtain the estimated glomerular filtration  rate (eGFR) is the CKD-EPI equation.      03/13/2017 >60.0 >60 mL/min/1.73 m^2 Final     Comment:     Calculation used to obtain the estimated glomerular filtration  rate (eGFR) is the CKD-EPI equation. Since race is unknown   in our information system, the eGFR values for   -American and Non--American patients are given   for each creatinine result.       eGFR if    Date Value Ref Range Status   12/12/2019 >60 >60 mL/min/1.73 m^2 Final   04/17/2018 >60.0 >60 mL/min/1.73 m^2 Final   03/13/2017 >60.0 >60 mL/min/1.73 m^2 Final                    Appointments  past 12m or future 3m with PCP    Date Provider   Last Visit   1/21/2020 Carrillo Flynn MD   Next Visit   Visit date not found Carrillo Flynn MD   ED visits in past 90 days: 0        Note composed:8:05 AM 11/13/2020

## 2020-11-13 NOTE — TELEPHONE ENCOUNTER
Provider Staff:     Action is required for this patient.     Please schedule patient for Annual and Labs (CMP, LIPIDS)    Thanks!  [facility]     Appointments  past 12m or future 3m with PCP    Date Provider   Last Visit   1/21/2020 Carrillo Flynn MD   Next Visit   Visit date not found Carrillo Flynn MD     Note composed:1:57 PM 11/13/2020

## 2020-11-13 NOTE — TELEPHONE ENCOUNTER
Care Due:                  Date            Visit Type   Department     Provider  --------------------------------------------------------------------------------                                ESTABLISHED   [facility] INTERNAL  Last Visit: 01-      PATIENT      MEDICINE       DAVID CONNER  Next Visit: None Scheduled  None         None Found                                                            Last  Test          Frequency    Reason                     Performed    Due Date  --------------------------------------------------------------------------------    Office Visit  12 months..  FLUoxetine, pravastatin,   01-   01-                             valsartan................    CMP.........  12 months..  pravastatin, valsartan...  12- 12-    Lipid Panel.  12 months..  pravastatin..............  12- 12-    Powered by [employer]. Reference number: [id number]. 11/13/2020 1:27:30 AM   CST

## 2020-12-02 ENCOUNTER — PATIENT MESSAGE (OUTPATIENT)
Dept: ADMINISTRATIVE | Facility: HOSPITAL | Age: 65
End: 2020-12-02

## 2020-12-11 ENCOUNTER — PATIENT MESSAGE (OUTPATIENT)
Dept: OTHER | Facility: OTHER | Age: 65
End: 2020-12-11

## 2021-01-27 DIAGNOSIS — Z12.11 COLON CANCER SCREENING: ICD-10-CM

## 2021-02-23 RX ORDER — FLUOXETINE 10 MG/1
CAPSULE ORAL
Qty: 90 CAPSULE | Refills: 0 | Status: SHIPPED | OUTPATIENT
Start: 2021-02-23

## 2021-04-05 ENCOUNTER — PATIENT MESSAGE (OUTPATIENT)
Dept: ADMINISTRATIVE | Facility: HOSPITAL | Age: 66
End: 2021-04-05

## 2021-07-06 ENCOUNTER — PATIENT MESSAGE (OUTPATIENT)
Dept: ADMINISTRATIVE | Facility: HOSPITAL | Age: 66
End: 2021-07-06

## 2021-10-04 ENCOUNTER — PATIENT MESSAGE (OUTPATIENT)
Dept: ADMINISTRATIVE | Facility: HOSPITAL | Age: 66
End: 2021-10-04

## 2021-10-13 ENCOUNTER — TELEPHONE (OUTPATIENT)
Dept: ADMINISTRATIVE | Facility: HOSPITAL | Age: 66
End: 2021-10-13

## 2022-01-19 ENCOUNTER — PATIENT MESSAGE (OUTPATIENT)
Dept: ADMINISTRATIVE | Facility: HOSPITAL | Age: 67
End: 2022-01-19
Payer: MEDICARE

## 2022-03-16 ENCOUNTER — PATIENT MESSAGE (OUTPATIENT)
Dept: ADMINISTRATIVE | Facility: HOSPITAL | Age: 67
End: 2022-03-16
Payer: MEDICARE

## 2022-06-15 ENCOUNTER — PES CALL (OUTPATIENT)
Dept: ADMINISTRATIVE | Facility: CLINIC | Age: 67
End: 2022-06-15
Payer: MEDICARE

## 2022-09-13 ENCOUNTER — PES CALL (OUTPATIENT)
Dept: ADMINISTRATIVE | Facility: OTHER | Age: 67
End: 2022-09-13
Payer: MEDICARE

## 2025-03-25 ENCOUNTER — HOSPITAL ENCOUNTER (EMERGENCY)
Facility: HOSPITAL | Age: 70
Discharge: HOME | End: 2025-03-25
Payer: MEDICARE

## 2025-03-25 VITALS
SYSTOLIC BLOOD PRESSURE: 137 MMHG | TEMPERATURE: 98 F | WEIGHT: 122 LBS | BODY MASS INDEX: 22.45 KG/M2 | HEART RATE: 122 BPM | DIASTOLIC BLOOD PRESSURE: 78 MMHG | HEIGHT: 62 IN | RESPIRATION RATE: 17 BRPM | OXYGEN SATURATION: 96 %

## 2025-03-25 DIAGNOSIS — N20.0 KIDNEY STONE: Primary | ICD-10-CM

## 2025-03-25 DIAGNOSIS — E87.6 HYPOKALEMIA: ICD-10-CM

## 2025-03-25 LAB
ANION GAP SERPL CALCULATED.3IONS-SCNC: 20 MMOL/L (ref 7–16)
BACTERIA #/AREA URNS HPF: ABNORMAL /HPF
BASOPHILS # BLD AUTO: 0.07 K/UL (ref 0–0.2)
BASOPHILS NFR BLD AUTO: 0.4 % (ref 0–1)
BILIRUB UR QL STRIP: NEGATIVE
BUN SERPL-MCNC: 14 MG/DL (ref 10–20)
BUN/CREAT SERPL: 19 (ref 6–20)
CALCIUM SERPL-MCNC: 10.1 MG/DL (ref 8.4–10.2)
CHLORIDE SERPL-SCNC: 105 MMOL/L (ref 98–107)
CLARITY UR: CLEAR
CO2 SERPL-SCNC: 21 MMOL/L (ref 23–31)
COLOR UR: YELLOW
CREAT SERPL-MCNC: 0.74 MG/DL (ref 0.55–1.02)
DIFFERENTIAL METHOD BLD: ABNORMAL
EGFR (NO RACE VARIABLE) (RUSH/TITUS): 87 ML/MIN/1.73M2
EOSINOPHIL # BLD AUTO: 0.02 K/UL (ref 0–0.5)
EOSINOPHIL NFR BLD AUTO: 0.1 % (ref 1–4)
ERYTHROCYTE [DISTWIDTH] IN BLOOD BY AUTOMATED COUNT: 13.1 % (ref 11.5–14.5)
GLUCOSE SERPL-MCNC: 114 MG/DL (ref 82–115)
GLUCOSE UR STRIP-MCNC: NORMAL MG/DL
HCT VFR BLD AUTO: 41.4 % (ref 38–47)
HGB BLD-MCNC: 14.1 G/DL (ref 12–16)
HYALINE CASTS #/AREA URNS LPF: ABNORMAL /LPF
IMM GRANULOCYTES # BLD AUTO: 0.05 K/UL (ref 0–0.04)
IMM GRANULOCYTES NFR BLD: 0.3 % (ref 0–0.4)
KETONES UR STRIP-SCNC: NEGATIVE MG/DL
LEUKOCYTE ESTERASE UR QL STRIP: ABNORMAL
LYMPHOCYTES # BLD AUTO: 2.03 K/UL (ref 1–4.8)
LYMPHOCYTES NFR BLD AUTO: 12.4 % (ref 27–41)
MAGNESIUM SERPL-MCNC: 1.9 MG/DL (ref 1.6–2.6)
MCH RBC QN AUTO: 30.5 PG (ref 27–31)
MCHC RBC AUTO-ENTMCNC: 34.1 G/DL (ref 32–36)
MCV RBC AUTO: 89.4 FL (ref 80–96)
MONOCYTES # BLD AUTO: 1.04 K/UL (ref 0–0.8)
MONOCYTES NFR BLD AUTO: 6.4 % (ref 2–6)
MPC BLD CALC-MCNC: 10 FL (ref 9.4–12.4)
MUCOUS, UA: ABNORMAL /LPF
NEUTROPHILS # BLD AUTO: 13.14 K/UL (ref 1.8–7.7)
NEUTROPHILS NFR BLD AUTO: 80.4 % (ref 53–65)
NITRITE UR QL STRIP: NEGATIVE
NRBC # BLD AUTO: 0 X10E3/UL
NRBC, AUTO (.00): 0 %
PH UR STRIP: 5.5 PH UNITS
PLATELET # BLD AUTO: 321 K/UL (ref 150–400)
POTASSIUM SERPL-SCNC: 3 MMOL/L (ref 3.5–5.1)
PROT UR QL STRIP: 50
RBC # BLD AUTO: 4.63 M/UL (ref 4.2–5.4)
RBC # UR STRIP: ABNORMAL /UL
RBC #/AREA URNS HPF: 179 /HPF
SODIUM SERPL-SCNC: 143 MMOL/L (ref 136–145)
SP GR UR STRIP: 1.02
SQUAMOUS #/AREA URNS LPF: ABNORMAL /HPF
UROBILINOGEN UR STRIP-ACNC: NORMAL MG/DL
WBC # BLD AUTO: 16.35 K/UL (ref 4.5–11)
WBC #/AREA URNS HPF: 6 /HPF

## 2025-03-25 PROCEDURE — 83735 ASSAY OF MAGNESIUM: CPT | Performed by: NURSE PRACTITIONER

## 2025-03-25 PROCEDURE — 80048 BASIC METABOLIC PNL TOTAL CA: CPT | Performed by: NURSE PRACTITIONER

## 2025-03-25 PROCEDURE — 96361 HYDRATE IV INFUSION ADD-ON: CPT

## 2025-03-25 PROCEDURE — 63600175 PHARM REV CODE 636 W HCPCS: Performed by: NURSE PRACTITIONER

## 2025-03-25 PROCEDURE — 25500020 PHARM REV CODE 255: Performed by: NURSE PRACTITIONER

## 2025-03-25 PROCEDURE — 36415 COLL VENOUS BLD VENIPUNCTURE: CPT | Performed by: NURSE PRACTITIONER

## 2025-03-25 PROCEDURE — 96375 TX/PRO/DX INJ NEW DRUG ADDON: CPT

## 2025-03-25 PROCEDURE — 85025 COMPLETE CBC W/AUTO DIFF WBC: CPT | Performed by: NURSE PRACTITIONER

## 2025-03-25 PROCEDURE — 25000003 PHARM REV CODE 250: Performed by: NURSE PRACTITIONER

## 2025-03-25 PROCEDURE — 96365 THER/PROPH/DIAG IV INF INIT: CPT

## 2025-03-25 PROCEDURE — 81003 URINALYSIS AUTO W/O SCOPE: CPT | Performed by: NURSE PRACTITIONER

## 2025-03-25 PROCEDURE — 99285 EMERGENCY DEPT VISIT HI MDM: CPT | Mod: 25

## 2025-03-25 RX ORDER — MORPHINE SULFATE 4 MG/ML
4 INJECTION, SOLUTION INTRAMUSCULAR; INTRAVENOUS
Refills: 0 | Status: COMPLETED | OUTPATIENT
Start: 2025-03-25 | End: 2025-03-25

## 2025-03-25 RX ORDER — HYDROMORPHONE HYDROCHLORIDE 2 MG/1
2 TABLET ORAL EVERY 6 HOURS PRN
Qty: 12 TABLET | Refills: 0 | Status: SHIPPED | OUTPATIENT
Start: 2025-03-25

## 2025-03-25 RX ORDER — HYDROMORPHONE HYDROCHLORIDE 2 MG/ML
1 INJECTION, SOLUTION INTRAMUSCULAR; INTRAVENOUS; SUBCUTANEOUS
Refills: 0 | Status: COMPLETED | OUTPATIENT
Start: 2025-03-25 | End: 2025-03-25

## 2025-03-25 RX ORDER — IOPAMIDOL 755 MG/ML
100 INJECTION, SOLUTION INTRAVASCULAR
Status: COMPLETED | OUTPATIENT
Start: 2025-03-25 | End: 2025-03-25

## 2025-03-25 RX ORDER — PROMETHAZINE HYDROCHLORIDE 25 MG/1
25 TABLET ORAL EVERY 6 HOURS PRN
Qty: 15 TABLET | Refills: 0 | Status: SHIPPED | OUTPATIENT
Start: 2025-03-25

## 2025-03-25 RX ORDER — POTASSIUM CHLORIDE 20 MEQ/1
40 TABLET, EXTENDED RELEASE ORAL
Status: COMPLETED | OUTPATIENT
Start: 2025-03-25 | End: 2025-03-25

## 2025-03-25 RX ORDER — POTASSIUM CHLORIDE 20 MEQ/1
20 TABLET, EXTENDED RELEASE ORAL DAILY
Qty: 3 TABLET | Refills: 0 | Status: SHIPPED | OUTPATIENT
Start: 2025-03-25 | End: 2025-03-28

## 2025-03-25 RX ORDER — ONDANSETRON HYDROCHLORIDE 2 MG/ML
4 INJECTION, SOLUTION INTRAVENOUS
Status: COMPLETED | OUTPATIENT
Start: 2025-03-25 | End: 2025-03-25

## 2025-03-25 RX ADMIN — IOPAMIDOL 100 ML: 755 INJECTION, SOLUTION INTRAVENOUS at 10:03

## 2025-03-25 RX ADMIN — PROMETHAZINE HYDROCHLORIDE 25 MG: 25 INJECTION INTRAMUSCULAR; INTRAVENOUS at 12:03

## 2025-03-25 RX ADMIN — ONDANSETRON 4 MG: 2 INJECTION INTRAMUSCULAR; INTRAVENOUS at 09:03

## 2025-03-25 RX ADMIN — HYDROMORPHONE HYDROCHLORIDE 1 MG: 2 INJECTION, SOLUTION INTRAMUSCULAR; INTRAVENOUS; SUBCUTANEOUS at 12:03

## 2025-03-25 RX ADMIN — SODIUM CHLORIDE 1000 ML: 9 INJECTION, SOLUTION INTRAVENOUS at 09:03

## 2025-03-25 RX ADMIN — POTASSIUM CHLORIDE 40 MEQ: 1500 TABLET, EXTENDED RELEASE ORAL at 12:03

## 2025-03-25 RX ADMIN — MORPHINE SULFATE 4 MG: 4 INJECTION INTRAVENOUS at 09:03

## 2025-03-25 NOTE — DISCHARGE INSTRUCTIONS
Take your potassium as directed.  You will need to have your potassium level rechecked in a few days.  Strain your urine.  Follow up with your primary care provider in 2 days. Return to the emergency department for any increase in symptoms or for any other new or worrisome symptoms.

## 2025-03-25 NOTE — ED PROVIDER NOTES
Encounter Date: 3/25/2025       History     Chief Complaint   Patient presents with    Flank Pain    Abdominal Pain    Nausea     Presents to ED for complaints of bilateral flank pain, abdominal pain, blood in urine and nausea for 1 week.     69-year-old female presents to the emergency department to be evaluated for lower abdominal pain and nausea.  She has had lower abdominal pain for the last several days.  She vomited once yesterday.  She reports she has also had some diarrhea over the last couple of days.  Denies any dysuria, fever, chills.  She is concerned that she may have a kidney stone.    The history is provided by the patient.   Abdominal Pain  The other symptoms of the illness include nausea, vomiting and diarrhea. The other symptoms of the illness do not include fever, fatigue, jaundice, melena, dysuria, hematemesis, hematochezia, vaginal discharge or vaginal bleeding.   Symptoms associated with the illness do not include chills, anorexia, diaphoresis, heartburn, constipation, urgency, hematuria, frequency or back pain.     Review of patient's allergies indicates:   Allergen Reactions    Compazine [prochlorperazine edisylate] Nausea Only    Sulfa (sulfonamide antibiotics)     Pcn [penicillins] Rash     Past Medical History:   Diagnosis Date    Anxiety     Arthritis     Depression     Hyperlipidemia     Hypertension      Past Surgical History:   Procedure Laterality Date    APPENDECTOMY      COSMETIC SURGERY      reduction    HYSTERECTOMY      TONSILLECTOMY      TOTAL REDUCTION MAMMOPLASTY Bilateral 2003     Family History   Problem Relation Name Age of Onset    Dementia Mother      Cancer Mother          breast    Breast cancer Mother  66    Heart disease Father      Heart disease Maternal Grandfather      Breast cancer Paternal Grandmother       Social History[1]  Review of Systems   Constitutional:  Negative for chills, diaphoresis, fatigue and fever.   Gastrointestinal:  Positive for diarrhea,  nausea and vomiting. Negative for anorexia, constipation, heartburn, hematemesis, hematochezia, jaundice and melena.   Genitourinary:  Negative for dysuria, frequency, hematuria, urgency, vaginal bleeding and vaginal discharge.   Musculoskeletal:  Negative for back pain.   All other systems reviewed and are negative.      Physical Exam     Initial Vitals [03/25/25 0901]   BP Pulse Resp Temp SpO2   137/78 (!) 122 18 98.4 °F (36.9 °C) 96 %      MAP       --         Physical Exam    Vitals reviewed.  Constitutional: She appears well-developed and well-nourished.   Neck: Neck supple.   Cardiovascular:    Tachycardia present.         Pulmonary/Chest: Breath sounds normal.   Abdominal: Abdomen is soft. Bowel sounds are normal. She exhibits no distension and no mass. There is no abdominal tenderness. There is no rebound and no guarding.   Musculoskeletal:         General: Normal range of motion.      Cervical back: Neck supple.     Neurological: She is alert and oriented to person, place, and time. She has normal strength. GCS score is 15. GCS eye subscore is 4. GCS verbal subscore is 5. GCS motor subscore is 6.   Skin: Skin is warm and dry. Capillary refill takes less than 2 seconds.   Psychiatric: She has a normal mood and affect.         Medical Screening Exam   See Full Note    ED Course   Procedures  Labs Reviewed   BASIC METABOLIC PANEL - Abnormal       Result Value    Sodium 143      Potassium 3.0 (*)     Chloride 105      CO2 21 (*)     Anion Gap 20 (*)     Glucose 114      BUN 14      Creatinine 0.74      BUN/Creatinine Ratio 19      Calcium 10.1      eGFR 87     URINALYSIS - Abnormal    Color, UA Yellow      Clarity, UA Clear      pH, UA 5.5      Leukocytes, UA Trace (*)     Nitrites, UA Negative      Protein, UA 50 (*)     Glucose, UA Normal      Ketones, UA Negative      Urobilinogen, UA Normal      Bilirubin, UA Negative      Blood, UA Large (*)     Specific Gravity, UA 1.018     CBC WITH DIFFERENTIAL -  Abnormal    WBC 16.35 (*)     RBC 4.63      Hemoglobin 14.1      Hematocrit 41.4      MCV 89.4      MCH 30.5      MCHC 34.1      RDW 13.1      Platelet Count 321      MPV 10.0      Neutrophils % 80.4 (*)     Lymphocytes % 12.4 (*)     Monocytes % 6.4 (*)     Eosinophils % 0.1 (*)     Basophils % 0.4      Immature Granulocytes % 0.3      nRBC, Auto 0.0      Neutrophils, Abs 13.14 (*)     Lymphocytes, Absolute 2.03      Monocytes, Absolute 1.04 (*)     Eosinophils, Absolute 0.02      Basophils, Absolute 0.07      Immature Granulocytes, Absolute 0.05 (*)     nRBC, Absolute 0.00      Diff Type Auto     URINALYSIS, MICROSCOPIC - Abnormal    WBC, UA 6 (*)     RBC,  (*)     Bacteria, UA Few (*)     Squamous Epithelial Cells, UA Occasional (*)     Hyaline Casts, UA 2-5 (*)     Mucous Occasional (*)    MAGNESIUM - Normal    Magnesium 1.9     CBC W/ AUTO DIFFERENTIAL    Narrative:     The following orders were created for panel order CBC auto differential.  Procedure                               Abnormality         Status                     ---------                               -----------         ------                     CBC with Differential[[id number]]       Abnormal            Final result                 Please view results for these tests on the individual orders.          Imaging Results              CT Abdomen Pelvis W Wo Contrast (Final result)  Result time 03/25/25 11:40:24      Final result by Adams Marroquin MD (03/25/25 11:40:24)                   Impression:      1. Obstructing 0.6 cm calculus in the left distal ureter with moderate left hydroureteronephrosis and asymmetric hypoenhancement of the left renal parenchyma.  Additional punctate nonobstructing bilateral renal calculi.  2. Nonspecific mural thickening of the descending and sigmoid colon, which may be seen with infectious or inflammatory colitis.  3. Cholelithiasis without evidence for acute cholecystitis.  4. Scoliosis.  5. Additional  findings above.      Electronically signed by: Adams Marroquin MD  Date:    03/25/2025  Time:    11:40               Narrative:    EXAMINATION:  CT ABDOMEN PELVIS W WO CONTRAST    CLINICAL HISTORY:  Flank pain, kidney stone suspected;wbc 16,000, bilateral flank pain;    TECHNIQUE:  CT abdomen and pelvis performed before and after administration of 100 mL Isovue 370 intravenous contrast.    COMPARISON:  None    FINDINGS:  Lung bases are clear.  No pericardial or pleural effusion.    There is a 1.5 cm cyst in the left hepatic lobe with several smaller cysts noted.  Liver is otherwise unremarkable.  No suspicious lesions.  Hepatic and portal veins are patent.  Pancreas and spleen are unremarkable.  Note made of calcified stones layering within the gallbladder.  No inflammatory changes.  No biliary dilatation.    Adrenal glands are unremarkable.  There is moderate left hydronephrosis and hydroureter with obstructing 0.6 cm stone in the distal ureter.  There are several additional punctate stones in the left renal collecting system.  There is asymmetric hypoenhancement of the left renal parenchyma.  Note made of several punctate nonobstructing right renal calculi as well as several small right renal cysts.    GI tract demonstrates no evidence for obstruction.  There is mural thickening throughout the descending and sigmoid colon, accentuated by nondistention.  Appendix not confidently identified.    No retroperitoneal lymphadenopathy.  No ascites.  Abdominal aorta is normal caliber.    Status post hysterectomy.  No adnexal masses.  Urinary bladder is unremarkable.    There is lumbar levoscoliosis and thoracic dextroscoliosis multilevel degenerative changes.  No aggressive appearing lytic or blastic lesion                                       XR ABDOMEN, ACUTE 2 OR MORE VIEWS WITH CHEST (Final result)  Result time 03/25/25 09:54:41      Final result by Benjamin Penny MD (03/25/25 09:54:41)                    Impression:      1. No acute radiographic findings in the chest or abdomen.  2. Possible bilateral renal stones.      Electronically signed by: Benjamin Penny MD  Date:    03/25/2025  Time:    09:54               Narrative:    EXAMINATION:  XR ABDOMEN ACUTE 2 OR MORE VIEWS WITH CHEST    CLINICAL HISTORY:  lower abdominal pain;    TECHNIQUE:  AP chest and upright and supine abdominal radiographs    COMPARISON:  None.    FINDINGS:  Mediastinal structures are midline.  Hilar contours are unremarkable.  Cardiac silhouette is normal in size.  Lung volumes are normal and symmetric.  No consolidation.  No pneumothorax or pleural effusion.    No intra-abdominal free air.  There is scattered gas within nondilated loops of bowel.  There is a mild amount of retained fecal material throughout the visualized colon.  Few pelvic vascular calcifications.  Mild atherosclerotic calcification.  Possible bilateral renal stones.  No radiographic findings to suggest organomegaly or mass effect.    No acute osseous abnormalities.  Severe S-shaped scoliosis of the thoracolumbar spine.  Degenerative changes of the spine and shoulders.                                       Medications   HYDROmorphone (PF) injection 1 mg (has no administration in time range)   promethazine (PHENERGAN) 25 mg in 0.9% NaCl 50 mL IVPB (has no administration in time range)   potassium chloride SA CR tablet 40 mEq (has no administration in time range)   sodium chloride 0.9% bolus 1,000 mL 1,000 mL (1,000 mLs Intravenous New Bag 3/25/25 0942)   morphine injection 4 mg (4 mg Intravenous Given 3/25/25 0936)   ondansetron injection 4 mg (4 mg Intravenous Given 3/25/25 0936)   iopamidoL (ISOVUE-370) injection 100 mL (100 mLs Intravenous Given 3/25/25 1039)     Medical Decision Making  69-year-old female presents to the emergency department to be evaluated for lower abdominal pain and nausea.  She has had lower abdominal pain for the last several days.  She vomited once  yesterday.  She reports she has also had some diarrhea over the last couple of days.  Denies any dysuria, fever, chills.  She is concerned that she may have a kidney stone.  Labs ordered and reviewed  X-rays ordered, films reviewed as well as the radiologist's interpretation significant for   1. No acute radiographic findings in the chest or abdomen.  2. Possible bilateral renal stones.  Urinalysis ordered and reviewed  CT ordered, reviewed as well as the radiologist's interpretation significant for 1. Obstructing 0.6 cm calculus in the left distal ureter with moderate left hydroureteronephrosis and asymmetric hypoenhancement of the left renal parenchyma.  Additional punctate nonobstructing bilateral renal calculi.  2. Nonspecific mural thickening of the descending and sigmoid colon, which may be seen with infectious or inflammatory colitis.  3. Cholelithiasis without evidence for acute cholecystitis.  4. Scoliosis.  Diagnosis: Kidney stone, hypokalemia  Treated with potassium chloride p.o., morphine, Zofran, Dilaudid, Phenergan and IV fluids  Prescribed potassium chloride, Phenergan and Dilaudid  Strainer ordered    Amount and/or Complexity of Data Reviewed  Labs: ordered.  Radiology: ordered.    Risk  Prescription drug management.                                      Clinical Impression:   Final diagnoses:  [N20.0] Kidney stone (Primary)  [E87.6] Hypokalemia        ED Disposition Condition    Discharge Stable          ED Prescriptions       Medication Sig Dispense Start Date End Date Auth. Provider    HYDROmorphone (DILAUDID) 2 MG tablet Take 1 tablet (2 mg total) by mouth every 6 (six) hours as needed for Pain. 12 tablet 3/25/2025 -- Jennifer Dobbs FNP    promethazine (PHENERGAN) 25 MG tablet Take 1 tablet (25 mg total) by mouth every 6 (six) hours as needed for Nausea. 15 tablet 3/25/2025 -- Jennifer Dobbs FNP    potassium chloride SA (K-DUR,KLOR-CON) 20 MEQ tablet Take 1 tablet (20 mEq total) by  mouth once daily. for 3 days 3 tablet 3/25/2025 3/28/2025 Jennifer Dobbs FNP          Follow-up Information    None            Jennifer Dobbs FNP  03/25/25 1225         [1]   Social History  Tobacco Use    Smoking status: Former    Smokeless tobacco: Never   Substance Use Topics    Drug use: Yes     Types: Marijuana        Jennifer Dobbs FNP  03/25/25 122

## 2025-08-05 ENCOUNTER — OFFICE VISIT (OUTPATIENT)
Dept: FAMILY MEDICINE | Facility: CLINIC | Age: 70
End: 2025-08-05
Payer: MEDICARE

## 2025-08-05 ENCOUNTER — PATIENT MESSAGE (OUTPATIENT)
Dept: FAMILY MEDICINE | Facility: CLINIC | Age: 70
End: 2025-08-05
Payer: MEDICARE

## 2025-08-05 VITALS
WEIGHT: 120 LBS | TEMPERATURE: 98 F | BODY MASS INDEX: 22.08 KG/M2 | HEIGHT: 62 IN | HEART RATE: 72 BPM | OXYGEN SATURATION: 98 %

## 2025-08-05 DIAGNOSIS — Z12.11 ENCOUNTER FOR COLORECTAL CANCER SCREENING: ICD-10-CM

## 2025-08-05 DIAGNOSIS — E78.5 HYPERLIPIDEMIA, UNSPECIFIED HYPERLIPIDEMIA TYPE: ICD-10-CM

## 2025-08-05 DIAGNOSIS — Z11.59 NEED FOR HEPATITIS C SCREENING TEST: ICD-10-CM

## 2025-08-05 DIAGNOSIS — Z12.39 ENCOUNTER FOR SCREENING FOR MALIGNANT NEOPLASM OF BREAST, UNSPECIFIED SCREENING MODALITY: ICD-10-CM

## 2025-08-05 DIAGNOSIS — Z12.31 ENCOUNTER FOR SCREENING MAMMOGRAM FOR MALIGNANT NEOPLASM OF BREAST: ICD-10-CM

## 2025-08-05 DIAGNOSIS — F32.2 MAJOR DEPRESSIVE DISORDER, SINGLE EPISODE, SEVERE WITHOUT PSYCHOTIC FEATURES: ICD-10-CM

## 2025-08-05 DIAGNOSIS — I10 HYPERTENSION, ESSENTIAL: Primary | ICD-10-CM

## 2025-08-05 DIAGNOSIS — Z12.12 ENCOUNTER FOR COLORECTAL CANCER SCREENING: ICD-10-CM

## 2025-08-05 DIAGNOSIS — Z78.0 POST-MENOPAUSAL: ICD-10-CM

## 2025-08-05 LAB
ALBUMIN SERPL BCP-MCNC: 4.6 G/DL (ref 3.4–4.8)
ALBUMIN/GLOB SERPL: 1.5 {RATIO}
ALP SERPL-CCNC: 60 U/L (ref 40–150)
ALT SERPL W P-5'-P-CCNC: 28 U/L
ANION GAP SERPL CALCULATED.3IONS-SCNC: 13 MMOL/L (ref 7–16)
AST SERPL W P-5'-P-CCNC: 35 U/L (ref 11–45)
BASOPHILS # BLD AUTO: 0.05 K/UL (ref 0–0.2)
BASOPHILS NFR BLD AUTO: 0.8 % (ref 0–1)
BILIRUB SERPL-MCNC: 0.5 MG/DL
BUN SERPL-MCNC: 16 MG/DL (ref 10–20)
BUN/CREAT SERPL: 28 (ref 6–20)
CALCIUM SERPL-MCNC: 9.4 MG/DL (ref 8.4–10.2)
CHLORIDE SERPL-SCNC: 105 MMOL/L (ref 98–107)
CHOLEST SERPL-MCNC: 147 MG/DL
CHOLEST/HDLC SERPL: 2.3 {RATIO}
CO2 SERPL-SCNC: 28 MMOL/L (ref 23–31)
CREAT SERPL-MCNC: 0.58 MG/DL (ref 0.55–1.02)
DIFFERENTIAL METHOD BLD: ABNORMAL
EGFR (NO RACE VARIABLE) (RUSH/TITUS): 97 ML/MIN/1.73M2
EOSINOPHIL # BLD AUTO: 0.08 K/UL (ref 0–0.5)
EOSINOPHIL NFR BLD AUTO: 1.3 % (ref 1–4)
ERYTHROCYTE [DISTWIDTH] IN BLOOD BY AUTOMATED COUNT: 13.3 % (ref 11.5–14.5)
GLOBULIN SER-MCNC: 3.1 G/DL (ref 2–4)
GLUCOSE SERPL-MCNC: 77 MG/DL (ref 82–115)
HCT VFR BLD AUTO: 40.5 % (ref 38–47)
HCV AB SER QL: NORMAL
HDLC SERPL-MCNC: 65 MG/DL (ref 35–60)
HGB BLD-MCNC: 13.3 G/DL (ref 12–16)
IMM GRANULOCYTES # BLD AUTO: 0.02 K/UL (ref 0–0.04)
IMM GRANULOCYTES NFR BLD: 0.3 % (ref 0–0.4)
LDLC SERPL CALC-MCNC: 62 MG/DL
LDLC/HDLC SERPL: 1 {RATIO}
LYMPHOCYTES # BLD AUTO: 1.83 K/UL (ref 1–4.8)
LYMPHOCYTES NFR BLD AUTO: 29.3 % (ref 27–41)
MCH RBC QN AUTO: 30.3 PG (ref 27–31)
MCHC RBC AUTO-ENTMCNC: 32.8 G/DL (ref 32–36)
MCV RBC AUTO: 92.3 FL (ref 80–96)
MONOCYTES # BLD AUTO: 0.38 K/UL (ref 0–0.8)
MONOCYTES NFR BLD AUTO: 6.1 % (ref 2–6)
MPC BLD CALC-MCNC: 10.6 FL (ref 9.4–12.4)
NEUTROPHILS # BLD AUTO: 3.89 K/UL (ref 1.8–7.7)
NEUTROPHILS NFR BLD AUTO: 62.2 % (ref 53–65)
NONHDLC SERPL-MCNC: 82 MG/DL
NRBC # BLD AUTO: 0 X10E3/UL
NRBC, AUTO (.00): 0 %
PLATELET # BLD AUTO: 237 K/UL (ref 150–400)
POTASSIUM SERPL-SCNC: 3.5 MMOL/L (ref 3.5–5.1)
PROT SERPL-MCNC: 7.7 G/DL (ref 5.8–7.6)
RBC # BLD AUTO: 4.39 M/UL (ref 4.2–5.4)
SODIUM SERPL-SCNC: 142 MMOL/L (ref 136–145)
TRIGL SERPL-MCNC: 99 MG/DL (ref 37–140)
TSH SERPL DL<=0.005 MIU/L-ACNC: 1.12 UIU/ML (ref 0.35–4.94)
VLDLC SERPL-MCNC: 20 MG/DL
WBC # BLD AUTO: 6.25 K/UL (ref 4.5–11)

## 2025-08-05 PROCEDURE — 99204 OFFICE O/P NEW MOD 45 MIN: CPT | Mod: ,,, | Performed by: NURSE PRACTITIONER

## 2025-08-05 RX ORDER — PRAVASTATIN SODIUM 40 MG/1
40 TABLET ORAL NIGHTLY
Qty: 90 TABLET | Refills: 3 | Status: SHIPPED | OUTPATIENT
Start: 2025-08-05

## 2025-08-05 RX ORDER — CITALOPRAM 20 MG/1
20 TABLET ORAL DAILY
COMMUNITY
End: 2025-08-05 | Stop reason: SDUPTHER

## 2025-08-05 RX ORDER — AMLODIPINE BESYLATE 10 MG/1
10 TABLET ORAL DAILY
COMMUNITY
Start: 2025-06-18 | End: 2025-08-05 | Stop reason: SDUPTHER

## 2025-08-05 RX ORDER — AMLODIPINE BESYLATE 10 MG/1
10 TABLET ORAL DAILY
Qty: 90 TABLET | Refills: 3 | Status: SHIPPED | OUTPATIENT
Start: 2025-08-05

## 2025-08-05 RX ORDER — CITALOPRAM 20 MG/1
20 TABLET ORAL DAILY
Qty: 90 TABLET | Refills: 3 | Status: SHIPPED | OUTPATIENT
Start: 2025-08-05

## 2025-08-05 NOTE — PROGRESS NOTES
Subjective     Patient ID: Didi Angeles is a 69 y.o. female.    Chief Complaint: Establish Care and Health Maintenance (Hepatitis C Screening Never done- Order/Mammogram due on 12/12/2020-Order/Colorectal Cancer Screening due on 01/20/2021- pt declined/DEXA Scan due on 01/21/2022-pt declined/Shingles Vaccine(2 of 2) due on 07/02/2025 - pt declined)    Pt presents to establish care.      Review of Systems   Constitutional:  Negative for activity change, appetite change, chills, fatigue, fever and unexpected weight change.   HENT:  Negative for nasal congestion, ear discharge, hearing loss, nosebleeds, postnasal drip, rhinorrhea, sinus pressure/congestion, sneezing, sore throat, tinnitus and trouble swallowing.    Eyes:  Negative for pain, discharge, redness, itching and visual disturbance.   Respiratory:  Negative for cough, choking, chest tightness, shortness of breath and wheezing.    Cardiovascular:  Negative for chest pain and palpitations.   Gastrointestinal:  Negative for abdominal distention, abdominal pain, blood in stool, change in bowel habit, constipation, diarrhea, nausea and vomiting.   Endocrine: Negative for polydipsia and polyuria.   Genitourinary:  Negative for decreased urine volume, difficulty urinating, dysuria, flank pain, frequency and hematuria.   Musculoskeletal:  Positive for arthralgias. Negative for back pain, gait problem, joint swelling and neck pain.   Integumentary:  Negative for wound, breast mass and breast discharge.   Allergic/Immunologic: Negative for immunocompromised state.   Neurological:  Negative for dizziness, weakness, light-headedness and headaches.   Psychiatric/Behavioral:  Negative for agitation, behavioral problems, confusion, dysphoric mood and hallucinations.    Breast: Negative for mass         Objective     Physical Exam  Vitals and nursing note reviewed.   Constitutional:       Appearance: Normal appearance.   Cardiovascular:      Rate and Rhythm: Normal rate  and regular rhythm.      Heart sounds: Normal heart sounds.   Pulmonary:      Effort: Pulmonary effort is normal.      Breath sounds: Normal breath sounds.   Musculoskeletal:         General: Normal range of motion.   Neurological:      Mental Status: She is alert and oriented to person, place, and time.   Psychiatric:         Mood and Affect: Mood normal.         Behavior: Behavior normal.            Assessment and Plan     1. Hypertension, essential  -     CBC Auto Differential; Future; Expected date: 08/05/2025  -     Comprehensive Metabolic Panel; Future; Expected date: 08/05/2025  -     Lipid Panel; Future; Expected date: 08/05/2025  -     TSH; Future; Expected date: 08/05/2025  -     amLODIPine (NORVASC) 10 MG tablet; Take 1 tablet (10 mg total) by mouth once daily.  Dispense: 90 tablet; Refill: 3  Controlled at 137/78  Low sodium diet    2. Major depressive disorder, single episode, severe without psychotic features  -     citalopram (CELEXA) 20 MG tablet; Take 1 tablet (20 mg total) by mouth once daily.  Dispense: 90 tablet; Refill: 3    3. Hyperlipidemia, unspecified hyperlipidemia type  -     pravastatin (PRAVACHOL) 40 MG tablet; Take 1 tablet (40 mg total) by mouth every evening.  Dispense: 90 tablet; Refill: 3  Low cholesterol diet  Avoid fried foods    4. Encounter for screening for malignant neoplasm of breast, unspecified screening modality  -     Mammo Digital Screening Bilat w/ Marcellus (XPD); Future; Expected date: 08/05/2025    5. Encounter for screening mammogram for malignant neoplasm of breast  -     Mammo Digital Screening Bilat w/ Marcellus (XPD); Future; Expected date: 08/05/2025    6. Need for hepatitis C screening test  -     Hepatitis C Antibody; Future; Expected date: 08/05/2025    7. Post-menopausal  -     DXA Bone Density Axial Skeleton 1 or more sites; Future; Expected date: 08/05/2025    8. Encounter for colorectal cancer screening  -     Cologuard Screening (Multitarget Stool DNA); Future;  Expected date: 08/05/2025        Will call pt with lab results.          Follow up in about 6 months (around 2/5/2026).

## 2025-08-21 ENCOUNTER — PATIENT MESSAGE (OUTPATIENT)
Dept: FAMILY MEDICINE | Facility: CLINIC | Age: 70
End: 2025-08-21
Payer: MEDICARE

## 2025-08-22 ENCOUNTER — PATIENT MESSAGE (OUTPATIENT)
Dept: FAMILY MEDICINE | Facility: CLINIC | Age: 70
End: 2025-08-22
Payer: MEDICARE

## 2025-08-22 ENCOUNTER — HOSPITAL ENCOUNTER (OUTPATIENT)
Dept: RADIOLOGY | Facility: HOSPITAL | Age: 70
Discharge: HOME | End: 2025-08-22
Attending: NURSE PRACTITIONER
Payer: MEDICARE

## 2025-08-22 DIAGNOSIS — Z78.0 POST-MENOPAUSAL: ICD-10-CM

## 2025-08-22 DIAGNOSIS — M85.80 OSTEOPENIA, UNSPECIFIED LOCATION: Primary | ICD-10-CM

## 2025-08-22 PROCEDURE — 77080 DXA BONE DENSITY AXIAL: CPT | Mod: 26,,, | Performed by: NUCLEAR MEDICINE

## 2025-08-22 PROCEDURE — 77080 DXA BONE DENSITY AXIAL: CPT | Mod: TC
